# Patient Record
Sex: FEMALE | Race: WHITE | NOT HISPANIC OR LATINO | Employment: FULL TIME | URBAN - METROPOLITAN AREA
[De-identification: names, ages, dates, MRNs, and addresses within clinical notes are randomized per-mention and may not be internally consistent; named-entity substitution may affect disease eponyms.]

---

## 2017-03-26 ENCOUNTER — APPOINTMENT (OUTPATIENT)
Dept: GENERAL RADIOLOGY | Facility: HOSPITAL | Age: 27
End: 2017-03-26

## 2017-03-26 ENCOUNTER — HOSPITAL ENCOUNTER (EMERGENCY)
Facility: HOSPITAL | Age: 27
Discharge: HOME | End: 2017-03-26
Attending: EMERGENCY MEDICINE | Admitting: EMERGENCY MEDICINE

## 2017-03-26 VITALS
RESPIRATION RATE: 16 BRPM | WEIGHT: 150 LBS | HEART RATE: 66 BPM | HEIGHT: 65 IN | SYSTOLIC BLOOD PRESSURE: 110 MMHG | BODY MASS INDEX: 24.99 KG/M2 | OXYGEN SATURATION: 98 % | TEMPERATURE: 98.3 F | DIASTOLIC BLOOD PRESSURE: 70 MMHG

## 2017-03-26 DIAGNOSIS — T14.8XXA AVULSION OF SKIN: Primary | ICD-10-CM

## 2017-03-26 PROCEDURE — 73130 X-RAY EXAM OF HAND: CPT

## 2017-03-26 PROCEDURE — 99283 EMERGENCY DEPT VISIT LOW MDM: CPT

## 2017-03-26 RX ORDER — CEPHALEXIN 500 MG/1
500 CAPSULE ORAL 4 TIMES DAILY
Qty: 20 CAPSULE | Refills: 0 | Status: SHIPPED | OUTPATIENT
Start: 2017-03-26 | End: 2017-03-31

## 2017-03-26 NOTE — ED NOTES
Pt reports lac to left thumb after cutting it with a mirror. Pt to ER room with gauze applied per RANDY Taylor. Bleeding is controlled with pressure dressing. Reassurance given; call light in reach. Pts breathing even and unlabored. Pt appears in NAD at this time. Family at bedside.        Jennifer Mac RN  03/26/17 5440

## 2017-03-26 NOTE — ED PROVIDER NOTES
The patient presents complaining of a laceration to the L thumb. Pt states that she cut her thumb on broken glass from a mirror. Pt states that she is R hand dominant. Pt states that her tetanus is UTD.    Patient is nontoxic appearing   Back/extremities: Avulsion to L thumb that is actively bleeding      I supervised care provided by the midlevel provider.  We have discussed this patient's history, physical exam, and treatment plan.  I have reviewed the note and personally saw and examined the patient and agree with the plan of care.    Entered by Zachariah Almonte, acting as scribe for Dr. Abarca.       Zachariah Almonte  03/26/17 4286       Brandon Abarca MD  03/26/17 3435

## 2017-03-26 NOTE — ED PROVIDER NOTES
EMERGENCY DEPARTMENT ENCOUNTER    CHIEF COMPLAINT  Chief Complaint: skin avulsion  History given by:patient  History limited by:nothing  Room Number:   PMD: No Known Provider      HPI:  Pt is a 26 y.o. female who presents with skin avulsion to the right thumb secondary to an injury that occurred 4 hours ago. Pt states that she was taking a mirror off of a wall when it fell and cut her thumb. Denies numbness or tingling.  Denies any additional area of injury.  She did not wash the wound at the time.  Pt states that her Tetanus is up-to-date.    Duration: 4 hours   Timing:constant  Location:right thumb  Radiation:none  Quality: 1 cm  Intensity/Severity:mild  Progression:unchanged  Associated Symptoms:none  Aggravating Factors:none stated  Alleviating Factors:none stated   Previous Episodes:none stated   Treatment before arrival:none stated     MEDICAL RECORD REVIEW  Pt has no pertinent history.    PAST MEDICAL HISTORY  Active Ambulatory Problems     Diagnosis Date Noted   • No Active Ambulatory Problems     Resolved Ambulatory Problems     Diagnosis Date Noted   •  contractions 2016   • Incompetent cervix in pregnancy 2016   • Premature rupture of membranes 2016     No Additional Past Medical History       PAST SURGICAL HISTORY  Past Surgical History:   Procedure Laterality Date   •  SECTION N/A 2016    Procedure:  SECTION PRIMARY;  Surgeon: Cecilia Dimas MD;  Location: [facility] LABOR DELIVERY;  Service:        FAMILY HISTORY  Family History   Problem Relation Age of Onset   • Hypertension Father    • Hypertension Mother        SOCIAL HISTORY  Social History     Social History   • Marital status:      Spouse name: N/A   • Number of children: N/A   • Years of education: N/A     Occupational History   • Not on file.     Social History Main Topics   • Smoking status: Never Smoker   • Smokeless tobacco: Not on file   • Alcohol use No   • Drug use: No   • Sexual  activity: Yes     Partners: Male     Other Topics Concern   • Not on file     Social History Narrative       ALLERGIES  Review of patient's allergies indicates no known allergies.    REVIEW OF SYSTEMS  Review of Systems   Constitutional: Negative.  Negative for activity change, appetite change, chills, fatigue and fever.   HENT: Negative.  Negative for congestion, ear pain, rhinorrhea and sore throat.    Eyes: Negative.    Respiratory: Negative.  Negative for cough and shortness of breath.    Cardiovascular: Negative.  Negative for chest pain, palpitations and leg swelling.   Gastrointestinal: Negative.  Negative for abdominal pain, constipation, diarrhea, nausea and vomiting.   Endocrine: Negative.    Genitourinary: Negative.  Negative for decreased urine volume, difficulty urinating, dysuria, menstrual problem, pelvic pain, urgency and vaginal discharge.   Musculoskeletal: Negative.  Negative for back pain.   Skin: Positive for wound (laceration to the right thumb). Negative for rash.   Allergic/Immunologic: Negative.    Neurological: Negative.  Negative for dizziness, weakness, light-headedness, numbness and headaches.   Hematological: Negative.    Psychiatric/Behavioral: Negative.  The patient is not nervous/anxious.    All other systems reviewed and are negative.      PHYSICAL EXAM  ED Triage Vitals   Temp Heart Rate Resp BP SpO2   03/26/17 1336 03/26/17 1336 03/26/17 1336 03/26/17 1338 03/26/17 1336   99.1 °F (37.3 °C) 79 16 127/90 98 %      Temp src Heart Rate Source Patient Position BP Location FiO2 (%)   03/26/17 1336 03/26/17 1336 -- -- --   Tympanic Monitor          Physical Exam   Constitutional: She is oriented to person, place, and time and well-developed, well-nourished, and in no distress. No distress.   HENT:   Head: Normocephalic and atraumatic.   Eyes: EOM are normal.   Neck: Normal range of motion.   Pulmonary/Chest: Effort normal. No respiratory distress.   Musculoskeletal:   Pt moves joints of  the thumb without difficulty.   Neurological: She is alert and oriented to person, place, and time.   Skin: Skin is warm and dry. No pallor.   1 cm area to the lateral aspect of her thumb that is avulsed with active bleeding   Psychiatric: Mood, memory, affect and judgment normal.   Nursing note and vitals reviewed.    RADIOLOGY  XR Hand 3+ View Left   Preliminary Result   No osseous deformity is evident.              I ordered the above noted radiological studies and reviewed the images on the PACS system.          PROCEDURES  Laceration Repair  Date/Time: 3/26/2017 4:58 PM  Performed by: JEREMIAS BLUNT  Authorized by: JS REDDY   Consent: Verbal consent obtained.  Risks and benefits: risks, benefits and alternatives were discussed  Consent given by: patient  Body area: upper extremity  Location details: right thumb  Laceration length: 1 cm  Foreign bodies: no foreign bodies  Tendon involvement: none  Nerve involvement: none  Vascular damage: no  Sedation:  Patient sedated: no    Preparation: Patient was prepped and draped in the usual sterile fashion.  Irrigation solution: saline (Hibicleanse)  Amount of cleaning: standard  Dressing: surgicel.  Patient tolerance: Patient tolerated the procedure well with no immediate complications          COURSE & MEDICAL DECISION MAKING  Pertinent Imaging studies that were ordered and reviewed are noted above.  Results were reviewed/discussed with the patient and they were also made aware of online assess.  Pt also made aware that some labs, such as cultures, will not be resulted during ER visit and follow up with PMD is necessary.       PROGRESS AND CONSULTS    Progress Notes:    1620- Reviewed pt's history and workup with Dr. Reddy.  After a bedside evaluation; Dr. eRddy agrees with the plan of care.    1624- Ordered XR left hand.    1658- Laceration procedure performed. Discussed xray findings at bedside with patient and father.  Instructed pt to keep  "dressing on for 24 hours and then keep it covered after that. Instructed pt to return to the ED if she is bleeding through the dressings. The patient's history, physical exam, and lab findings were discussed with the physician, who also performed a face to face history and physical exam.  I discussed all results and noted any abnormalities with patient.  Discussed absoute need to recheck abnormalities with their family physician.  I answered any of the patient's questions.  Discussed plan for discharge, as there is no emergent indication for admission.  Pt is agreeable and understands need for follow up and repeat testing.  Pt is aware that discharge does not mean that nothing is wrong but it indicates no emergency is present and they must continue care with their family physician.  Pt is discharged with instructions to follow up with primary care doctor to have their blood pressure rechecked.       MEDICATIONS GIVEN IN ER  Medications - No data to display    /70  Pulse 66  Temp 98.3 °F (36.8 °C) (Tympanic)   Resp 16  Ht 65\" (165.1 cm)  Wt 150 lb (68 kg)  SpO2 98%  BMI 24.96 kg/m2      DIAGNOSIS  Final diagnoses:   Avulsion of skin       FOLLOW UP   Pito Ulloa MD  [address] [place] [address]  [phone]            RX     Medication List      New Prescriptions          cephalexin 500 MG capsule   Commonly known as:  KEFLEX   Take 1 capsule by mouth 4 (Four) Times a Day for 5 days.         Stop          EPINEPHrine 0.3 MG/0.3ML solution auto-injector injection   Commonly known as:  EPIPEN 2-KIRSTIE           I personally scribed for Mariann Taylor PA-C on 3/26/2017 at 5:42 PM.  Electronically signed by Elza Zamora on 3/26/2017 at time 5:42 PM           Mili Zamora  03/26/17 1712       Mariann Taylor PA-C  03/26/17 1718       Mariann Taylor PA-C  03/26/17 1742       Mariann Taylor PA-C  03/27/17 0002    "

## 2017-03-26 NOTE — DISCHARGE INSTRUCTIONS
PLEASE READ AND REVIEW ALL DISCHARGE INSTRUCTIONS.     Please follow up with your primary care physician for any further evaluation/treatment and further management of your blood pressure.     Take all prescribed medicine as written and continue chronic medication.    Keep dressing in place for 24 hours.  After that time, area clean, dry, and covered with new clean dressing.      If you have any redness, increased pain, warmth, or pustular drainage, return to the ER.  Follow-up with Dr. Ulloa, hand surgeon.

## 2017-06-20 ENCOUNTER — TRANSCRIBE ORDERS (OUTPATIENT)
Dept: ADMINISTRATIVE | Facility: HOSPITAL | Age: 27
End: 2017-06-20

## 2017-06-20 DIAGNOSIS — Z87.51 HISTORY OF PRETERM DELIVERY: Primary | ICD-10-CM

## 2017-06-30 ENCOUNTER — HOSPITAL ENCOUNTER (OUTPATIENT)
Dept: ULTRASOUND IMAGING | Facility: HOSPITAL | Age: 27
Discharge: HOME | End: 2017-06-30
Attending: OBSTETRICS & GYNECOLOGY

## 2017-12-22 ENCOUNTER — ANESTHESIA (OUTPATIENT)
Dept: PERIOP | Facility: HOSPITAL | Age: 27
End: 2017-12-22

## 2017-12-22 ENCOUNTER — ANESTHESIA EVENT (OUTPATIENT)
Dept: PERIOP | Facility: HOSPITAL | Age: 27
End: 2017-12-22

## 2017-12-22 ENCOUNTER — HOSPITAL ENCOUNTER (OUTPATIENT)
Facility: HOSPITAL | Age: 27
Setting detail: HOSPITAL OUTPATIENT SURGERY
Discharge: HOME | End: 2017-12-22
Attending: OBSTETRICS & GYNECOLOGY | Admitting: OBSTETRICS & GYNECOLOGY

## 2017-12-22 VITALS
SYSTOLIC BLOOD PRESSURE: 115 MMHG | WEIGHT: 141.3 LBS | HEIGHT: 65 IN | OXYGEN SATURATION: 98 % | RESPIRATION RATE: 16 BRPM | BODY MASS INDEX: 23.54 KG/M2 | HEART RATE: 58 BPM | TEMPERATURE: 98.6 F | DIASTOLIC BLOOD PRESSURE: 69 MMHG

## 2017-12-22 DIAGNOSIS — O02.1 MISSED AB: ICD-10-CM

## 2017-12-22 LAB
ABO GROUP BLD: NORMAL
BASOPHILS # BLD AUTO: 0.03 10*3/MM3 (ref 0–0.2)
BASOPHILS NFR BLD AUTO: 0.7 % (ref 0–1.5)
BLD GP AB SCN SERPL QL: NEGATIVE
DEPRECATED RDW RBC AUTO: 45.4 FL (ref 37–54)
EOSINOPHIL # BLD AUTO: 0.06 10*3/MM3 (ref 0–0.7)
EOSINOPHIL NFR BLD AUTO: 1.4 % (ref 0.3–6.2)
ERYTHROCYTE [DISTWIDTH] IN BLOOD BY AUTOMATED COUNT: 13.1 % (ref 11.7–13)
HCT VFR BLD AUTO: 26.4 % (ref 35.6–45.5)
HGB BLD-MCNC: 8.8 G/DL (ref 11.9–15.5)
IMM GRANULOCYTES # BLD: 0 10*3/MM3 (ref 0–0.03)
IMM GRANULOCYTES NFR BLD: 0 % (ref 0–0.5)
LYMPHOCYTES # BLD AUTO: 1.72 10*3/MM3 (ref 0.9–4.8)
LYMPHOCYTES NFR BLD AUTO: 40.5 % (ref 19.6–45.3)
MCH RBC QN AUTO: 31.5 PG (ref 26.9–32)
MCHC RBC AUTO-ENTMCNC: 33.3 G/DL (ref 32.4–36.3)
MCV RBC AUTO: 94.6 FL (ref 80.5–98.2)
MONOCYTES # BLD AUTO: 0.42 10*3/MM3 (ref 0.2–1.2)
MONOCYTES NFR BLD AUTO: 9.9 % (ref 5–12)
NEUTROPHILS # BLD AUTO: 2.02 10*3/MM3 (ref 1.9–8.1)
NEUTROPHILS NFR BLD AUTO: 47.5 % (ref 42.7–76)
PLATELET # BLD AUTO: 143 10*3/MM3 (ref 140–500)
PMV BLD AUTO: 10.1 FL (ref 6–12)
RBC # BLD AUTO: 2.79 10*6/MM3 (ref 3.9–5.2)
RH BLD: POSITIVE
WBC NRBC COR # BLD: 4.25 10*3/MM3 (ref 4.5–10.7)

## 2017-12-22 PROCEDURE — 25010000002 MIDAZOLAM PER 1 MG: Performed by: ANESTHESIOLOGY

## 2017-12-22 PROCEDURE — 25010000002 MIDAZOLAM PER 1 MG: Performed by: NURSE ANESTHETIST, CERTIFIED REGISTERED

## 2017-12-22 PROCEDURE — 25010000002 PROPOFOL 10 MG/ML EMULSION: Performed by: NURSE ANESTHETIST, CERTIFIED REGISTERED

## 2017-12-22 PROCEDURE — 25010000002 DEXAMETHASONE PER 1 MG: Performed by: NURSE ANESTHETIST, CERTIFIED REGISTERED

## 2017-12-22 PROCEDURE — 85025 COMPLETE CBC W/AUTO DIFF WBC: CPT | Performed by: OBSTETRICS & GYNECOLOGY

## 2017-12-22 PROCEDURE — 86901 BLOOD TYPING SEROLOGIC RH(D): CPT | Performed by: OBSTETRICS & GYNECOLOGY

## 2017-12-22 PROCEDURE — 86850 RBC ANTIBODY SCREEN: CPT | Performed by: OBSTETRICS & GYNECOLOGY

## 2017-12-22 PROCEDURE — 25010000002 ROPIVACAINE PER 1 MG: Performed by: OBSTETRICS & GYNECOLOGY

## 2017-12-22 PROCEDURE — 88305 TISSUE EXAM BY PATHOLOGIST: CPT | Performed by: OBSTETRICS & GYNECOLOGY

## 2017-12-22 PROCEDURE — 25010000002 FENTANYL CITRATE (PF) 100 MCG/2ML SOLUTION: Performed by: NURSE ANESTHETIST, CERTIFIED REGISTERED

## 2017-12-22 PROCEDURE — 86900 BLOOD TYPING SEROLOGIC ABO: CPT | Performed by: OBSTETRICS & GYNECOLOGY

## 2017-12-22 PROCEDURE — 25010000002 ONDANSETRON PER 1 MG: Performed by: NURSE ANESTHETIST, CERTIFIED REGISTERED

## 2017-12-22 RX ORDER — PROMETHAZINE HYDROCHLORIDE 25 MG/1
12.5 TABLET ORAL ONCE AS NEEDED
Status: DISCONTINUED | OUTPATIENT
Start: 2017-12-22 | End: 2017-12-22 | Stop reason: HOSPADM

## 2017-12-22 RX ORDER — DEXAMETHASONE SODIUM PHOSPHATE 4 MG/ML
INJECTION, SOLUTION INTRA-ARTICULAR; INTRALESIONAL; INTRAMUSCULAR; INTRAVENOUS; SOFT TISSUE AS NEEDED
Status: DISCONTINUED | OUTPATIENT
Start: 2017-12-22 | End: 2017-12-22 | Stop reason: SURG

## 2017-12-22 RX ORDER — MAGNESIUM HYDROXIDE 1200 MG/15ML
LIQUID ORAL AS NEEDED
Status: DISCONTINUED | OUTPATIENT
Start: 2017-12-22 | End: 2017-12-22 | Stop reason: HOSPADM

## 2017-12-22 RX ORDER — PROMETHAZINE HYDROCHLORIDE 25 MG/1
25 SUPPOSITORY RECTAL ONCE AS NEEDED
Status: DISCONTINUED | OUTPATIENT
Start: 2017-12-22 | End: 2017-12-22 | Stop reason: HOSPADM

## 2017-12-22 RX ORDER — FLUMAZENIL 0.1 MG/ML
0.2 INJECTION INTRAVENOUS AS NEEDED
Status: DISCONTINUED | OUTPATIENT
Start: 2017-12-22 | End: 2017-12-22 | Stop reason: HOSPADM

## 2017-12-22 RX ORDER — PROPOFOL 10 MG/ML
VIAL (ML) INTRAVENOUS CONTINUOUS PRN
Status: DISCONTINUED | OUTPATIENT
Start: 2017-12-22 | End: 2017-12-22 | Stop reason: SURG

## 2017-12-22 RX ORDER — HYDROCODONE BITARTRATE AND ACETAMINOPHEN 7.5; 325 MG/1; MG/1
1 TABLET ORAL ONCE AS NEEDED
Status: DISCONTINUED | OUTPATIENT
Start: 2017-12-22 | End: 2017-12-22 | Stop reason: HOSPADM

## 2017-12-22 RX ORDER — OXYCODONE AND ACETAMINOPHEN 7.5; 325 MG/1; MG/1
1 TABLET ORAL ONCE AS NEEDED
Status: DISCONTINUED | OUTPATIENT
Start: 2017-12-22 | End: 2017-12-22 | Stop reason: HOSPADM

## 2017-12-22 RX ORDER — FENTANYL CITRATE 50 UG/ML
50 INJECTION, SOLUTION INTRAMUSCULAR; INTRAVENOUS
Status: DISCONTINUED | OUTPATIENT
Start: 2017-12-22 | End: 2017-12-22 | Stop reason: HOSPADM

## 2017-12-22 RX ORDER — OXYCODONE HYDROCHLORIDE AND ACETAMINOPHEN 5; 325 MG/1; MG/1
1 TABLET ORAL EVERY 4 HOURS PRN
Qty: 30 TABLET | Refills: 0 | Status: SHIPPED | OUTPATIENT
Start: 2017-12-22 | End: 2019-03-13 | Stop reason: HOSPADM

## 2017-12-22 RX ORDER — PROMETHAZINE HYDROCHLORIDE 25 MG/ML
12.5 INJECTION, SOLUTION INTRAMUSCULAR; INTRAVENOUS ONCE AS NEEDED
Status: DISCONTINUED | OUTPATIENT
Start: 2017-12-22 | End: 2017-12-22 | Stop reason: HOSPADM

## 2017-12-22 RX ORDER — ONDANSETRON 2 MG/ML
INJECTION INTRAMUSCULAR; INTRAVENOUS AS NEEDED
Status: DISCONTINUED | OUTPATIENT
Start: 2017-12-22 | End: 2017-12-22 | Stop reason: SURG

## 2017-12-22 RX ORDER — ROPIVACAINE HYDROCHLORIDE 5 MG/ML
INJECTION, SOLUTION EPIDURAL; INFILTRATION; PERINEURAL AS NEEDED
Status: DISCONTINUED | OUTPATIENT
Start: 2017-12-22 | End: 2017-12-22 | Stop reason: HOSPADM

## 2017-12-22 RX ORDER — MIDAZOLAM HYDROCHLORIDE 1 MG/ML
2 INJECTION INTRAMUSCULAR; INTRAVENOUS
Status: DISCONTINUED | OUTPATIENT
Start: 2017-12-22 | End: 2017-12-22 | Stop reason: HOSPADM

## 2017-12-22 RX ORDER — IBUPROFEN 800 MG/1
800 TABLET ORAL EVERY 6 HOURS PRN
Qty: 50 TABLET | Refills: 3 | Status: SHIPPED | OUTPATIENT
Start: 2017-12-22 | End: 2019-03-13 | Stop reason: HOSPADM

## 2017-12-22 RX ORDER — SODIUM CHLORIDE, SODIUM LACTATE, POTASSIUM CHLORIDE, CALCIUM CHLORIDE 600; 310; 30; 20 MG/100ML; MG/100ML; MG/100ML; MG/100ML
9 INJECTION, SOLUTION INTRAVENOUS CONTINUOUS
Status: DISCONTINUED | OUTPATIENT
Start: 2017-12-22 | End: 2017-12-22 | Stop reason: HOSPADM

## 2017-12-22 RX ORDER — DIPHENHYDRAMINE HYDROCHLORIDE 50 MG/ML
12.5 INJECTION INTRAMUSCULAR; INTRAVENOUS
Status: DISCONTINUED | OUTPATIENT
Start: 2017-12-22 | End: 2017-12-22 | Stop reason: HOSPADM

## 2017-12-22 RX ORDER — MIDAZOLAM HYDROCHLORIDE 1 MG/ML
1 INJECTION INTRAMUSCULAR; INTRAVENOUS
Status: DISCONTINUED | OUTPATIENT
Start: 2017-12-22 | End: 2017-12-22 | Stop reason: HOSPADM

## 2017-12-22 RX ORDER — NALOXONE HCL 0.4 MG/ML
0.2 VIAL (ML) INJECTION AS NEEDED
Status: DISCONTINUED | OUTPATIENT
Start: 2017-12-22 | End: 2017-12-22 | Stop reason: HOSPADM

## 2017-12-22 RX ORDER — PROMETHAZINE HYDROCHLORIDE 25 MG/1
25 TABLET ORAL ONCE AS NEEDED
Status: DISCONTINUED | OUTPATIENT
Start: 2017-12-22 | End: 2017-12-22 | Stop reason: HOSPADM

## 2017-12-22 RX ORDER — ONDANSETRON 2 MG/ML
4 INJECTION INTRAMUSCULAR; INTRAVENOUS ONCE AS NEEDED
Status: DISCONTINUED | OUTPATIENT
Start: 2017-12-22 | End: 2017-12-22 | Stop reason: HOSPADM

## 2017-12-22 RX ORDER — MIDAZOLAM HYDROCHLORIDE 1 MG/ML
INJECTION INTRAMUSCULAR; INTRAVENOUS AS NEEDED
Status: DISCONTINUED | OUTPATIENT
Start: 2017-12-22 | End: 2017-12-22 | Stop reason: SURG

## 2017-12-22 RX ORDER — FAMOTIDINE 10 MG/ML
20 INJECTION, SOLUTION INTRAVENOUS ONCE
Status: COMPLETED | OUTPATIENT
Start: 2017-12-22 | End: 2017-12-22

## 2017-12-22 RX ORDER — FENTANYL CITRATE 50 UG/ML
INJECTION, SOLUTION INTRAMUSCULAR; INTRAVENOUS AS NEEDED
Status: DISCONTINUED | OUTPATIENT
Start: 2017-12-22 | End: 2017-12-22 | Stop reason: SURG

## 2017-12-22 RX ORDER — SODIUM CHLORIDE 0.9 % (FLUSH) 0.9 %
1-10 SYRINGE (ML) INJECTION AS NEEDED
Status: DISCONTINUED | OUTPATIENT
Start: 2017-12-22 | End: 2017-12-22 | Stop reason: HOSPADM

## 2017-12-22 RX ORDER — EPHEDRINE SULFATE 50 MG/ML
5 INJECTION, SOLUTION INTRAVENOUS ONCE AS NEEDED
Status: DISCONTINUED | OUTPATIENT
Start: 2017-12-22 | End: 2017-12-22 | Stop reason: HOSPADM

## 2017-12-22 RX ADMIN — Medication 1 MG: at 09:11

## 2017-12-22 RX ADMIN — SODIUM CHLORIDE, POTASSIUM CHLORIDE, SODIUM LACTATE AND CALCIUM CHLORIDE: 600; 310; 30; 20 INJECTION, SOLUTION INTRAVENOUS at 09:23

## 2017-12-22 RX ADMIN — FENTANYL CITRATE 50 MCG: 50 INJECTION INTRAMUSCULAR; INTRAVENOUS at 09:25

## 2017-12-22 RX ADMIN — ONDANSETRON 4 MG: 2 INJECTION INTRAMUSCULAR; INTRAVENOUS at 09:34

## 2017-12-22 RX ADMIN — FAMOTIDINE 20 MG: 10 INJECTION, SOLUTION INTRAVENOUS at 09:12

## 2017-12-22 RX ADMIN — PROPOFOL 200 MCG/KG/MIN: 10 INJECTION, EMULSION INTRAVENOUS at 09:25

## 2017-12-22 RX ADMIN — DEXAMETHASONE SODIUM PHOSPHATE 8 MG: 4 INJECTION INTRA-ARTICULAR; INTRALESIONAL; INTRAMUSCULAR; INTRAVENOUS; SOFT TISSUE at 09:31

## 2017-12-22 RX ADMIN — SODIUM CHLORIDE, POTASSIUM CHLORIDE, SODIUM LACTATE AND CALCIUM CHLORIDE 9 ML/HR: 600; 310; 30; 20 INJECTION, SOLUTION INTRAVENOUS at 09:11

## 2017-12-22 RX ADMIN — FENTANYL CITRATE 50 MCG: 50 INJECTION INTRAMUSCULAR; INTRAVENOUS at 09:28

## 2017-12-22 RX ADMIN — Medication 2 MG: at 09:23

## 2017-12-22 NOTE — ANESTHESIA PREPROCEDURE EVALUATION
Anesthesia Evaluation     Patient summary reviewed and Nursing notes reviewed   NPO Solid Status: > 8 hours  NPO Liquid Status: > 8 hours     Airway   Mallampati: II  no difficulty expected  Dental - normal exam     Pulmonary     breath sounds clear to auscultation  Cardiovascular     Rhythm: regular        Neuro/Psych  GI/Hepatic/Renal/Endo      Musculoskeletal     Abdominal    Substance History      OB/GYN          Other                                        Anesthesia Plan    ASA 1     MAC   (MAC)  intravenous induction   Anesthetic plan and risks discussed with patient.

## 2017-12-22 NOTE — ANESTHESIA POSTPROCEDURE EVALUATION
"Patient: Jer Belcher    Procedure Summary     Date Anesthesia Start Anesthesia Stop Room / Location    12/22/17 0923 0957  [facility] / [facility]       Procedure Diagnosis Surgeon Provider    DILATATION AND CURETTAGE WITH SUCTION (N/A Vagina) No diagnosis on file. MD Jose Bargg MD          Anesthesia Type: MAC  Last vitals  BP   115/69 (12/22/17 1040)   Temp   37 °C (98.6 °F) (12/22/17 0954)   Pulse   58 (12/22/17 1040)   Resp   16 (12/22/17 1040)     SpO2   98 % (12/22/17 1040)     Post Anesthesia Care and Evaluation    Patient location during evaluation: PACU  Patient participation: complete - patient participated  Level of consciousness: awake and alert  Pain management: adequate  Airway patency: patent  Anesthetic complications: No anesthetic complications    Cardiovascular status: acceptable  Respiratory status: acceptable  Hydration status: acceptable    Comments: /69  Pulse 58  Temp 37 °C (98.6 °F) (Oral)   Resp 16  Ht 165.1 cm (65\")  Wt 64.1 kg (141 lb 4.8 oz)  SpO2 98%  BMI 23.51 kg/m2      "

## 2017-12-22 NOTE — INTERVAL H&P NOTE
H&P reviewed. The patient was examined and there are no changes to the H&P.     Cecilia Dimas MD  12/22/17  8:44 AM

## 2017-12-22 NOTE — OP NOTE
Subjective     Date of Service:  12/22/17    Surgical Staff: Surgeon(s) and Role:     * Cecilia Dimas MD - Primary   Assistant:  none     Pre-operative diagnosis(es): Missed ab     Post-operative diagnosis(es): same       Procedure(s): Procedure(s):  DILATATION AND CURETTAGE WITH SUCTION           Anesthesia: Type: Monitor Anesthesia Care  ASA:  I     Objective      Operative findings: POCs   Specimens removed:   ID Type Source Tests Collected by Time Destination   A : GESTATIONAL AGE 6.4 WEEKS Tissue Products of Conception TISSUE EXAM Cecilia iDmas MD 12/22/2017 0854           EBL:    Specimens:   minimal      Order Name Source Comment Collection Info Order Time   TYPE AND SCREEN   Collected By: Shannan Dodge RN 12/22/2017  8:38 AM   TISSUE PATHOLOGY EXAM Products of Conception  Collected By: Cecilia Dimas MD 12/22/2017  9:47 AM      Antibiotics:                none ordered on call to OR   Drains:     [REMOVED] Urethral Catheter 12/22/17 0935 100% silicone (Removed)   Removed 12/22/17 0936          Complications: none       Indication for surgery: Missed ab       Description of Procedure: Patient was identified in holding and taken to the operating room.  She was placed in the supine position and sedated.  She was then placed the lithotomy position and prepped and draped.  Surgical timeout was done to assess correct patient and procedure.  Weighted speculum was placed in the vagina and anterior lip of cervix was grasped with an Allis clamp.  Paracervical block was given half percent Marcaine was used.  Uterine cavity sounded to 8 cm depth.  Dilatation of the cervix was easily accomplished and suction and sharp curettage were used in combination to remove all tissue.  This was felt to be complete instruments removed from the vagina patient tolerated procedure well and went to recovery in good condition.  Sponge and instrument counts are correct.           Assessment/Plan               Cecilia Dimas  MD  12/22/17  9:48 AM

## 2017-12-23 LAB
CYTO UR: NORMAL
LAB AP CASE REPORT: NORMAL
Lab: NORMAL
PATH REPORT.FINAL DX SPEC: NORMAL
PATH REPORT.GROSS SPEC: NORMAL

## 2018-10-11 LAB
EXTERNAL HEPATITIS B SURFACE ANTIGEN: NEGATIVE
EXTERNAL HEPATITIS C AB: NON REACTIVE
EXTERNAL RUBELLA QUALITATIVE: NORMAL
EXTERNAL SYPHILIS RPR SCREEN: NORMAL
HIV1 P24 AG SERPL QL IA: NORMAL

## 2019-01-11 ENCOUNTER — TRANSCRIBE ORDERS (OUTPATIENT)
Dept: ADMINISTRATIVE | Facility: HOSPITAL | Age: 29
End: 2019-01-11

## 2019-01-11 DIAGNOSIS — N88.3 INCOMPETENT CERVIX: ICD-10-CM

## 2019-01-11 DIAGNOSIS — Z87.51 HISTORY OF PRETERM DELIVERY: Primary | ICD-10-CM

## 2019-01-22 ENCOUNTER — HOSPITAL ENCOUNTER (OUTPATIENT)
Dept: ULTRASOUND IMAGING | Facility: HOSPITAL | Age: 29
Discharge: HOME | End: 2019-01-22
Attending: OBSTETRICS & GYNECOLOGY | Admitting: OBSTETRICS & GYNECOLOGY

## 2019-01-22 DIAGNOSIS — N88.3 INCOMPETENT CERVIX: ICD-10-CM

## 2019-01-22 DIAGNOSIS — Z87.51 HISTORY OF PRETERM DELIVERY: ICD-10-CM

## 2019-01-22 PROCEDURE — 76817 TRANSVAGINAL US OBSTETRIC: CPT

## 2019-01-22 PROCEDURE — 76805 OB US >/= 14 WKS SNGL FETUS: CPT

## 2019-01-22 NOTE — CONSULTS
"Ms. Belcher is referred by Dr Neal for MFM consultation on indication of \"incompetent cervix  She is accompanied by  Lexx who was present throughout the ultrasound exam and consultation.    30  23 1/7 at per JORY 19 per 8 3/7 week CRL    Prenatal course is significant for:  Hx PTD at 32 weeks, short cx at 23 weeks.      PMH  Obstetric History       T0      L1     SAB0   TAB0   Ectopic0   Molar0   Multiple0   Live Births1       # Outcome Date GA Lbr Jose/2nd Weight Sex Delivery Anes PTL Lv   1  16 31w6d  2044 g (4 lb 8.1 oz) M CS-LTranv Spinal Y KAIDEN      Apgar1:  8                Apgar5: 9          Past Medical History:   Diagnosis Date   • Miscarriage        No Known Allergies    Past Surgical History:   Procedure Laterality Date   •  SECTION N/A 2016    Procedure:  SECTION PRIMARY;  Surgeon: Cecilia Dimas MD;  Location: [facility] LABOR DELIVERY;  Service:    • D&C WITH SUCTION N/A 2017    Procedure: DILATATION AND CURETTAGE WITH SUCTION;  Surgeon: Cecilia Dimas MD;  Location: [facility] MAIN OR;  Service:        ASA 81 mg  Nifedipine 20 mg capsule 1 q 4 hours prn ctx    Family history is negative for mental retardation, trisomy 21, congenital heart disease, spina bifida, cystic fibrosis, Gnosticist ancestry.   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  PPROM and delivery at 32 weeks.   Normal cervical length at present.  Length of cervix is curved. Internal cervical os is flat, T shaped. A funnel of membranes and amniotic fluid is not present.     Nifedapine does not affect the incidence of recurrent  birth or improve  " outcome.       Recommendations:    Continue Prometrium 200mg capsules 1 intravaginally each night at bedtime thru 36 6/7 weeks.   Further TV US cervical length as clinically indicated by signs or symptoms of  birth.     Evaluate interval growth at 30, 36 weeks gestation     For billing purposes, duration of face to face consultation was approximately 15 minutes of which > 50% was devoted to patient counseling and coordination of care, exclusive of US exam.

## 2019-02-10 ENCOUNTER — HOSPITAL ENCOUNTER (OUTPATIENT)
Facility: HOSPITAL | Age: 29
Setting detail: OBSERVATION
Discharge: HOME | End: 2019-02-11
Attending: OBSTETRICS & GYNECOLOGY | Admitting: OBSTETRICS & GYNECOLOGY

## 2019-02-10 PROBLEM — Z34.90 PREGNANCY: Status: ACTIVE | Noted: 2019-02-10

## 2019-02-10 PROCEDURE — G0378 HOSPITAL OBSERVATION PER HR: HCPCS

## 2019-02-10 PROCEDURE — 59025 FETAL NON-STRESS TEST: CPT

## 2019-02-10 PROCEDURE — 96372 THER/PROPH/DIAG INJ SC/IM: CPT

## 2019-02-10 RX ORDER — NIFEDIPINE 20 MG/1
20 CAPSULE ORAL EVERY 4 HOURS PRN
COMMUNITY
End: 2019-05-11 | Stop reason: HOSPADM

## 2019-02-10 RX ORDER — TERBUTALINE SULFATE 1 MG/ML
0.25 INJECTION, SOLUTION SUBCUTANEOUS ONCE AS NEEDED
Status: DISCONTINUED | OUTPATIENT
Start: 2019-02-11 | End: 2019-02-11 | Stop reason: HOSPADM

## 2019-02-10 RX ORDER — ASPIRIN 81 MG/1
81 TABLET, CHEWABLE ORAL DAILY
COMMUNITY
End: 2019-05-11 | Stop reason: HOSPADM

## 2019-02-10 RX ORDER — TERBUTALINE SULFATE 1 MG/ML
0.25 INJECTION, SOLUTION SUBCUTANEOUS ONCE
Status: COMPLETED | OUTPATIENT
Start: 2019-02-11 | End: 2019-02-10

## 2019-02-10 RX ADMIN — TERBUTALINE SULFATE 0.25 MG: 1 INJECTION SUBCUTANEOUS at 23:27

## 2019-02-11 VITALS
SYSTOLIC BLOOD PRESSURE: 113 MMHG | WEIGHT: 155 LBS | HEIGHT: 65 IN | TEMPERATURE: 98 F | RESPIRATION RATE: 16 BRPM | BODY MASS INDEX: 25.83 KG/M2 | DIASTOLIC BLOOD PRESSURE: 69 MMHG | HEART RATE: 79 BPM

## 2019-02-11 PROCEDURE — G0378 HOSPITAL OBSERVATION PER HR: HCPCS

## 2019-02-11 PROCEDURE — 25010000002 TERBUTALINE PER 1 MG: Performed by: OBSTETRICS & GYNECOLOGY

## 2019-02-11 RX ORDER — NIFEDIPINE 10 MG/1
20 CAPSULE ORAL ONCE
Status: COMPLETED | OUTPATIENT
Start: 2019-02-11 | End: 2019-02-11

## 2019-02-11 RX ADMIN — NIFEDIPINE 20 MG: 10 CAPSULE ORAL at 01:39

## 2019-03-11 ENCOUNTER — HOSPITAL ENCOUNTER (INPATIENT)
Facility: HOSPITAL | Age: 29
LOS: 2 days | Discharge: HOME | End: 2019-03-13
Attending: OBSTETRICS & GYNECOLOGY | Admitting: OBSTETRICS & GYNECOLOGY

## 2019-03-11 LAB
ABO GROUP BLD: NORMAL
BLD GP AB SCN SERPL QL: NEGATIVE
DEPRECATED RDW RBC AUTO: 46.9 FL (ref 37–54)
ERYTHROCYTE [DISTWIDTH] IN BLOOD BY AUTOMATED COUNT: 13.2 % (ref 12.3–15.4)
HCT VFR BLD AUTO: 36.9 % (ref 34–46.6)
HGB BLD-MCNC: 12.1 G/DL (ref 12–15.9)
MCH RBC QN AUTO: 32.3 PG (ref 26.6–33)
MCHC RBC AUTO-ENTMCNC: 32.8 G/DL (ref 31.5–35.7)
MCV RBC AUTO: 98.4 FL (ref 79–97)
PLATELET # BLD AUTO: 216 10*3/MM3 (ref 140–450)
PMV BLD AUTO: 11.8 FL (ref 6–12)
RBC # BLD AUTO: 3.75 10*6/MM3 (ref 3.77–5.28)
RH BLD: POSITIVE
T&S EXPIRATION DATE: NORMAL
WBC NRBC COR # BLD: 11.05 10*3/MM3 (ref 3.4–10.8)

## 2019-03-11 PROCEDURE — G0378 HOSPITAL OBSERVATION PER HR: HCPCS

## 2019-03-11 PROCEDURE — 87081 CULTURE SCREEN ONLY: CPT | Performed by: OBSTETRICS & GYNECOLOGY

## 2019-03-11 PROCEDURE — 86900 BLOOD TYPING SEROLOGIC ABO: CPT | Performed by: OBSTETRICS & GYNECOLOGY

## 2019-03-11 PROCEDURE — 25010000002 BETAMETHASONE ACET & SOD PHOS PER 4 MG: Performed by: OBSTETRICS & GYNECOLOGY

## 2019-03-11 PROCEDURE — 85027 COMPLETE CBC AUTOMATED: CPT | Performed by: OBSTETRICS & GYNECOLOGY

## 2019-03-11 PROCEDURE — 86850 RBC ANTIBODY SCREEN: CPT | Performed by: OBSTETRICS & GYNECOLOGY

## 2019-03-11 PROCEDURE — 86901 BLOOD TYPING SEROLOGIC RH(D): CPT | Performed by: OBSTETRICS & GYNECOLOGY

## 2019-03-11 PROCEDURE — 25010000002 TERBUTALINE PER 1 MG: Performed by: OBSTETRICS & GYNECOLOGY

## 2019-03-11 PROCEDURE — 25010000002 AMPICILLIN PER 500 MG: Performed by: OBSTETRICS & GYNECOLOGY

## 2019-03-11 PROCEDURE — 59025 FETAL NON-STRESS TEST: CPT

## 2019-03-11 RX ORDER — SODIUM CHLORIDE 0.9 % (FLUSH) 0.9 %
3 SYRINGE (ML) INJECTION EVERY 12 HOURS SCHEDULED
Status: DISCONTINUED | OUTPATIENT
Start: 2019-03-11 | End: 2019-03-13 | Stop reason: HOSPADM

## 2019-03-11 RX ORDER — SODIUM CHLORIDE 0.9 % (FLUSH) 0.9 %
5 SYRINGE (ML) INJECTION AS NEEDED
Status: DISCONTINUED | OUTPATIENT
Start: 2019-03-11 | End: 2019-03-13 | Stop reason: HOSPADM

## 2019-03-11 RX ORDER — ACETAMINOPHEN 500 MG
1000 TABLET ORAL EVERY 6 HOURS PRN
COMMUNITY
End: 2019-05-11 | Stop reason: HOSPADM

## 2019-03-11 RX ORDER — SODIUM CHLORIDE, SODIUM LACTATE, POTASSIUM CHLORIDE, CALCIUM CHLORIDE 600; 310; 30; 20 MG/100ML; MG/100ML; MG/100ML; MG/100ML
75 INJECTION, SOLUTION INTRAVENOUS CONTINUOUS
Status: DISCONTINUED | OUTPATIENT
Start: 2019-03-11 | End: 2019-03-12

## 2019-03-11 RX ORDER — BISACODYL 5 MG/1
5 TABLET, DELAYED RELEASE ORAL DAILY PRN
Status: DISCONTINUED | OUTPATIENT
Start: 2019-03-11 | End: 2019-03-13 | Stop reason: HOSPADM

## 2019-03-11 RX ORDER — NIFEDIPINE 10 MG/1
20 CAPSULE ORAL ONCE
Status: COMPLETED | OUTPATIENT
Start: 2019-03-11 | End: 2019-03-11

## 2019-03-11 RX ORDER — PROMETHAZINE HYDROCHLORIDE 25 MG/1
12.5 TABLET ORAL EVERY 6 HOURS PRN
Status: DISCONTINUED | OUTPATIENT
Start: 2019-03-11 | End: 2019-03-13 | Stop reason: HOSPADM

## 2019-03-11 RX ORDER — BETAMETHASONE SODIUM PHOSPHATE AND BETAMETHASONE ACETATE 3; 3 MG/ML; MG/ML
12 INJECTION, SUSPENSION INTRA-ARTICULAR; INTRALESIONAL; INTRAMUSCULAR; SOFT TISSUE ONCE
Status: COMPLETED | OUTPATIENT
Start: 2019-03-12 | End: 2019-03-12

## 2019-03-11 RX ORDER — ONDANSETRON 2 MG/ML
4 INJECTION INTRAMUSCULAR; INTRAVENOUS EVERY 8 HOURS PRN
Status: DISCONTINUED | OUTPATIENT
Start: 2019-03-11 | End: 2019-03-13 | Stop reason: HOSPADM

## 2019-03-11 RX ORDER — BETAMETHASONE SODIUM PHOSPHATE AND BETAMETHASONE ACETATE 3; 3 MG/ML; MG/ML
12 INJECTION, SUSPENSION INTRA-ARTICULAR; INTRALESIONAL; INTRAMUSCULAR; SOFT TISSUE EVERY 24 HOURS
Status: DISCONTINUED | OUTPATIENT
Start: 2019-03-11 | End: 2019-03-11

## 2019-03-11 RX ORDER — ACETAMINOPHEN 325 MG/1
650 TABLET ORAL EVERY 4 HOURS PRN
Status: DISCONTINUED | OUTPATIENT
Start: 2019-03-11 | End: 2019-03-13 | Stop reason: HOSPADM

## 2019-03-11 RX ORDER — MAGNESIUM SULFATE HEPTAHYDRATE 40 MG/ML
2 INJECTION, SOLUTION INTRAVENOUS CONTINUOUS
Status: DISCONTINUED | OUTPATIENT
Start: 2019-03-11 | End: 2019-03-12

## 2019-03-11 RX ORDER — TERBUTALINE SULFATE 1 MG/ML
0.25 INJECTION, SOLUTION SUBCUTANEOUS ONCE
Status: COMPLETED | OUTPATIENT
Start: 2019-03-11 | End: 2019-03-11

## 2019-03-11 RX ADMIN — SODIUM CHLORIDE, POTASSIUM CHLORIDE, SODIUM LACTATE AND CALCIUM CHLORIDE 1000 ML: 600; 310; 30; 20 INJECTION, SOLUTION INTRAVENOUS at 13:25

## 2019-03-11 RX ADMIN — AMPICILLIN SODIUM 2 G: 2 INJECTION, POWDER, FOR SOLUTION INTRAMUSCULAR; INTRAVENOUS at 20:15

## 2019-03-11 RX ADMIN — BETAMETHASONE ACETATE AND BETAMETHASONE SODIUM PHOSPHATE 12 MG: 3; 3 INJECTION, SUSPENSION INTRA-ARTICULAR; INTRALESIONAL; INTRAMUSCULAR; SOFT TISSUE at 15:53

## 2019-03-11 RX ADMIN — NIFEDIPINE 20 MG: 10 CAPSULE ORAL at 13:29

## 2019-03-11 RX ADMIN — TERBUTALINE SULFATE 0.25 MG: 1 INJECTION SUBCUTANEOUS at 13:30

## 2019-03-11 RX ADMIN — SODIUM CHLORIDE, POTASSIUM CHLORIDE, SODIUM LACTATE AND CALCIUM CHLORIDE 125 ML/HR: 600; 310; 30; 20 INJECTION, SOLUTION INTRAVENOUS at 14:33

## 2019-03-11 RX ADMIN — MAGNESIUM SULFATE HEPTAHYDRATE 2 G/HR: 40 INJECTION, SOLUTION INTRAVENOUS at 23:40

## 2019-03-11 NOTE — PROGRESS NOTES
[facility]  Obstetric History and Physical    Patient Name: Jer Belcher  :  1990  MRN:  [id number]        Chief Complaint   Patient presents with   • Contractions     hx 31/6 delivery with last child. Was seen at office today for unrelated visit, but ctx noted, pt sates they are not painful but she can feel the tightening. Pt takes procardia 20 mg, last dose at 1015 today.        Subjective     Patient is a 28 y.o. female  currently at 30w0d, who presented to office with c/o intermittent sharp RLQ pain. Was noted to have contractions on monitor but had just taken her usual dose of procardia.     Initially just mild and irregular contractions noted. After exam started to have very frequent and significantly more painful contractions.    Now resolved after additional dose po procardia and 1 dose brethine.           Her prenatal care is significant for Hx   delivery with short cervix early in pregnancy @ 23 wks. Cx length normal during this pregnancy and also using progesterone PV.        Past Medical History: Past Medical History:   Diagnosis Date   • Miscarriage       Past Surgical History Past Surgical History:   Procedure Laterality Date   •  SECTION N/A 2016    Procedure:  SECTION PRIMARY;  Surgeon: Cecilia Dimas MD;  Location: [facility] LABOR DELIVERY;  Service:    • D&C WITH SUCTION N/A 2017    Procedure: DILATATION AND CURETTAGE WITH SUCTION;  Surgeon: Cecilia Dimas MD;  Location: [facility] OR;  Service:       Family History: Family History   Problem Relation Age of Onset   • Hypertension Father    • Hypertension Mother    • Malig Hyperthermia Neg Hx       Social History:  reports that  has never smoked. she has never used smokeless tobacco.   reports that she does not drink alcohol.   reports that she does not use drugs.    Allergies:     Patient has no known allergies.     Past OB History:       Obstetric History       T0      L1     SAB1    TAB0   Ectopic0   Molar0   Multiple0   Live Births1       # Outcome Date GA Lbr Jose/2nd Weight Sex Delivery Anes PTL Lv   3 Current            2 SAB 12/10/17           1  16 31w6d  2044 g (4 lb 8.1 oz) M CS-LTranv Spinal Y KAIDEN      Apgar1:  8                Apgar5: 9            Objective       Vital Signs Range for the last 24 hours  Temperature: Temp:  [97.9 °F (36.6 °C)] 97.9 °F (36.6 °C)   Temp Source: Temp src: Oral   BP: BP: (108)/(64) 108/64   Pulse: Heart Rate:  [82-94] 94   Respirations: Resp:  [18] 18             Physical Exam:        General :    Alert and cooperative in NAD   Abdomen:  Gravid, non-tender, no masses   Vaginal exam: Cl-ft/50/posterior     FHR:   140's moderate variability, no decelerations   [place]:   No contractions noted.               A/P:  30 wks EGA with  contractions. Had a run of painful contractions after her last exam but quickly resolved after po procardia, 1 dose terbutaline, and IV fluids. Continue to observe.                   Antonio Florez MD  3/11/2019  3:02 PM

## 2019-03-11 NOTE — PROGRESS NOTES
Approximately 2 hours after repeat dose of procardia and terbutaline - reported contractions again - every 2-3 mins with increased intensity. Given her past hx - with G1 changed from cl to 2 cm @ this gestational age then had ROM approximately 1 week later and active labor.     Recommended beginning Magnesium for neuroprotection and tocolysis. Start Betamethasone.       Antonio Florez MD  3/11/2019  6:27 PM

## 2019-03-12 PROCEDURE — 25010000002 ONDANSETRON PER 1 MG: Performed by: OBSTETRICS & GYNECOLOGY

## 2019-03-12 PROCEDURE — 25010000002 BETAMETHASONE ACET & SOD PHOS PER 4 MG: Performed by: OBSTETRICS & GYNECOLOGY

## 2019-03-12 PROCEDURE — 25010000002 AMPICILLIN PER 500 MG: Performed by: OBSTETRICS & GYNECOLOGY

## 2019-03-12 PROCEDURE — 59025 FETAL NON-STRESS TEST: CPT

## 2019-03-12 RX ORDER — BUTORPHANOL TARTRATE 1 MG/ML
1 INJECTION, SOLUTION INTRAMUSCULAR; INTRAVENOUS EVERY 4 HOURS PRN
Status: DISCONTINUED | OUTPATIENT
Start: 2019-03-12 | End: 2019-03-13 | Stop reason: HOSPADM

## 2019-03-12 RX ADMIN — SODIUM CHLORIDE, POTASSIUM CHLORIDE, SODIUM LACTATE AND CALCIUM CHLORIDE 75 ML/HR: 600; 310; 30; 20 INJECTION, SOLUTION INTRAVENOUS at 04:20

## 2019-03-12 RX ADMIN — BETAMETHASONE ACETATE AND BETAMETHASONE SODIUM PHOSPHATE 12 MG: 3; 3 INJECTION, SUSPENSION INTRA-ARTICULAR; INTRALESIONAL; INTRAMUSCULAR; SOFT TISSUE at 03:59

## 2019-03-12 RX ADMIN — AMPICILLIN SODIUM 2 G: 2 INJECTION, POWDER, FOR SOLUTION INTRAMUSCULAR; INTRAVENOUS at 08:33

## 2019-03-12 RX ADMIN — ONDANSETRON 4 MG: 2 INJECTION INTRAMUSCULAR; INTRAVENOUS at 05:01

## 2019-03-12 RX ADMIN — SODIUM CHLORIDE, PRESERVATIVE FREE 3 ML: 5 INJECTION INTRAVENOUS at 21:25

## 2019-03-12 RX ADMIN — MAGNESIUM SULFATE HEPTAHYDRATE 2 G/HR: 40 INJECTION, SOLUTION INTRAVENOUS at 08:32

## 2019-03-12 RX ADMIN — AMPICILLIN SODIUM 2 G: 2 INJECTION, POWDER, FOR SOLUTION INTRAMUSCULAR; INTRAVENOUS at 15:01

## 2019-03-12 RX ADMIN — AMPICILLIN SODIUM 2 G: 2 INJECTION, POWDER, FOR SOLUTION INTRAMUSCULAR; INTRAVENOUS at 03:08

## 2019-03-12 NOTE — PLAN OF CARE
Problem: Patient Care Overview  Goal: Plan of Care Review  Outcome: Ongoing (interventions implemented as appropriate)    Goal: Individualization and Mutuality  Outcome: Ongoing (interventions implemented as appropriate)   19 1376   Mutuality/Individual Preferences   How to Address Anxieties/Fears discuss plan   Mutuality/Individual Preferences   What Anxieties, Fears, Concerns, or Questions Do You Have About Your Care? fear of early delivery     Goal: Discharge Needs Assessment  Outcome: Ongoing (interventions implemented as appropriate)      Problem:  Labor (Adult,Obstetrics,Pediatric)  Goal: Signs and Symptoms of Listed Potential Problems Will be Absent, Minimized or Managed ( Labor)  Outcome: Ongoing (interventions implemented as appropriate)

## 2019-03-12 NOTE — PLAN OF CARE
Problem:  Labor (Adult,Obstetrics,Pediatric)  Goal: Signs and Symptoms of Listed Potential Problems Will be Absent, Minimized or Managed ( Labor)  Outcome: Ongoing (interventions implemented as appropriate)   19 3021   Goal/Outcome Evaluation   Problems Assessed ( Labor) all   Problems Present ( Labor) none

## 2019-03-12 NOTE — PLAN OF CARE
Problem: Patient Care Overview  Goal: Plan of Care Review  Outcome: Ongoing (interventions implemented as appropriate)   03/12/19 1534   Coping/Psychosocial   Plan of Care Reviewed With patient;spouse   OTHER   Outcome Summary Transferred to     Plan of Care Review   Progress improving

## 2019-03-12 NOTE — PROGRESS NOTES
"[facility]  Obstetric Progress Note    Subjective  Patient has no complaints.  She continues to have occasional contractions which she states are further apart but stronger in intensity than yesterday.  Patient has slight brownish tinged discharge.  No evidence of rupture membranes.  Patient states baby is active.      Objective     Vital Signs Range for the last 24 hours  Temp:  [97.7 °F (36.5 °C)-98.6 °F (37 °C)] 98.1 °F (36.7 °C)   Temp src: Oral   BP: (101-138)/(46-80) 112/61   Heart Rate:  [] 87     /61   Pulse 87   Temp 98.1 °F (36.7 °C) (Oral)   Resp 16   Ht 165.1 cm (65\")   Wt 74.4 kg (164 lb)   SpO2 100%   BMI 27.29 kg/m²               Fetal Heart Rate Assessment   Reassuring.    Assessment/Plan       Assessment:  Intrauterine pregnancy at 30 weeks with  labor.  Patient has a history of  labor and delivery at 31 weeks.  Patient has been stable on IV magnesium overnight.  She is received both doses of betamethasone.  She continues to have occasional contractions which she rates as a 5 in intensity.      Plan:  Continue IV magnesium for full 24 hours following betamethasone.  Will discontinue magnesium at that time.  And observe closely for labor.        Pregnancy        Vilma Kaur MD  3/12/2019  10:28 AM    "

## 2019-03-12 NOTE — PLAN OF CARE
Problem: Patient Care Overview  Goal: Individualization and Mutuality  Outcome: Ongoing (interventions implemented as appropriate)   19 1616 19 0536   Individualization   Patient Specific Preferences --  prefers  section for this delivery but might  if favorable and best for baby. will discuss with MD when the time comes. pt. will have more time off work with c/s   Patient Specific Goals (Include Timeframe) --  stay pregnant as long as possible and hopefully reach term gestation   Mutuality/Individual Preferences   What Anxieties, Fears, Concerns, or Questions Do You Have About Your Care? fear of early delivery --

## 2019-03-12 NOTE — CONSULTS
Prenatal Consult    Referring OB:  Gautam    Reason for Consultation: potential premature single at 30 weeks gestation    Maternal History:   First last name is a 28 y.o. yr/o  with:  labor.  The  EDC is  Estimated Date of Delivery: Estimated Date of Delivery: 19. Mom has received steroids and is getting mag now.     Consult:  mother available for discussion.  We reviewed the fetal and  aspects of the above conditions to include: estimated survival rate, beneficial effects of steroids, early onset of sepsis, intraventricular hemorrhage, cerebral palsy, mental retardation, apnea of prematurity, feeding difficulty and temperature instability    Estimated length of stay: 6-8 weeks  Discussed the importance of providing human milk for pre-term and late pre-term infants: yes  Reviewed policies and procedures for NICU/ICN  Reviewed structure and function of \[facility] [place] [facility]\""-Neonatology     We will plan to attend delivery when requested.    Plan for  resuscitation: full resuscitation    Additional comments: Mom with previous 32 wga infant 3 years ago here at [facility].     Thank you for allowing us to participate in the care of your patient.     Jasvir Hook MD  19  11:31 AM      30 minutes were spent in consultation, greater than 90% face to face time.

## 2019-03-12 NOTE — PLAN OF CARE
Problem: Patient Care Overview  Goal: Plan of Care Review  Outcome: Ongoing (interventions implemented as appropriate)   19 0536   Coping/Psychosocial   Plan of Care Reviewed With patient;spouse   OTHER   Outcome Summary reactive fht's, VSS, denies LOF, scant amount of brownish discharge/show, pt. on 2gm magneisum sulfate, continue to monitor contractions   Plan of Care Review   Progress improving     Goal: Individualization and Mutuality  Outcome: Ongoing (interventions implemented as appropriate)   19 1616 19 0536   Individualization   Patient Specific Preferences --  prefers  section for this delivery but might  if favorable and best for baby. will discuss with MD when the time comes. pt. will have more time off work with c/s   Patient Specific Goals (Include Timeframe) --  stay pregnant as long as possible and hopefully reach term gestation   Patient Specific Interventions --  i.v. fluids, terbutaline, procardia, magnesium sulfate, ampicillin, gbs culture sent,    Mutuality/Individual Preferences   How to Address Anxieties/Fears discuss plan --    Mutuality/Individual Preferences   What Anxieties, Fears, Concerns, or Questions Do You Have About Your Care? fear of early delivery --      Goal: Interprofessional Rounds/Family Conf  Outcome: Ongoing (interventions implemented as appropriate)   19 0536   Interdisciplinary Rounds/Family Conf   Participants patient;physician;family       Problem:  Labor (Adult,Obstetrics,Pediatric)  Goal: Signs and Symptoms of Listed Potential Problems Will be Absent, Minimized or Managed ( Labor)  Outcome: Ongoing (interventions implemented as appropriate)   19 1850   Goal/Outcome Evaluation   Problems Assessed ( Labor) all   Problems Present ( Labor) none

## 2019-03-12 NOTE — PLAN OF CARE
Problem: Patient Care Overview  Goal: Interprofessional Rounds/Family Conf  Outcome: Ongoing (interventions implemented as appropriate)   03/12/19 2912   Interdisciplinary Rounds/Family Conf   Participants family;nursing;patient;pharmacy;physician

## 2019-03-13 VITALS
OXYGEN SATURATION: 97 % | WEIGHT: 164 LBS | HEART RATE: 74 BPM | DIASTOLIC BLOOD PRESSURE: 65 MMHG | RESPIRATION RATE: 16 BRPM | BODY MASS INDEX: 27.32 KG/M2 | TEMPERATURE: 98.2 F | SYSTOLIC BLOOD PRESSURE: 107 MMHG | HEIGHT: 65 IN

## 2019-03-13 PROBLEM — O60.03 PRETERM LABOR IN THIRD TRIMESTER WITHOUT DELIVERY: Status: ACTIVE | Noted: 2019-03-13

## 2019-03-13 LAB — BACTERIA SPEC AEROBE CULT: NORMAL

## 2019-03-13 PROCEDURE — 59025 FETAL NON-STRESS TEST: CPT

## 2019-03-13 RX ORDER — NIFEDIPINE 10 MG/1
20 CAPSULE ORAL
Status: DISCONTINUED | OUTPATIENT
Start: 2019-03-13 | End: 2019-03-13 | Stop reason: HOSPADM

## 2019-03-13 RX ADMIN — NIFEDIPINE 20 MG: 10 CAPSULE ORAL at 12:30

## 2019-03-13 RX ADMIN — NIFEDIPINE 20 MG: 10 CAPSULE ORAL at 09:17

## 2019-03-13 RX ADMIN — SODIUM CHLORIDE, PRESERVATIVE FREE 3 ML: 5 INJECTION INTRAVENOUS at 10:00

## 2019-03-13 NOTE — NON STRESS TEST
Jer Belcher, a  at 30w2d with an JORY of 2019, by Patient Reported, was seen at [facility]TE[facility] for a nonstress test.    Chief Complaint   Patient presents with   • Contractions     hx  delivery with last child. Was seen at office today for unrelated visit, but ctx noted, pt sates they are not painful but she can feel the tightening. Pt takes procardia 20 mg, last dose at 1015 today.        Patient Active Problem List   Diagnosis   • Pregnancy       Start Time: 741  Stop Time: 1003  Interpretation A  Nonstress Test Interpretation A: Reactive (19 1003 : Monica Cutler, RN)

## 2019-03-13 NOTE — DISCHARGE SUMMARY
HD 2    incompetnent cvx with hx PTD at 31wks.    On vag progesterone and procardia. Admitted 2 days ago with inc contns. S/p BMZ x2. S/p mag sulfate. And stable off mag.    Restarted procardia this am as feels like procardia helps a lot with contns.   Mucus d/c. No vb. irreg contns. Spaced after procardia.  Good fm    cvx essentially unchanged from my exams in office - ft/70.-2. Bulging lower segment. Somewhat post.    nst- reactive,    toco- q 5-10 min and after procardia monitored for couple hrs and spaced to just rare contn.        A/p- incompentent CVX with PTL and hx PTD at 31wks    cvx unchanged. contns spaced. Cont procardia. D/c home. Modified bed rest and strict vag rest. She is reliable and lives 4 minutes from hospital. Has appt in 2 days with me in office.

## 2019-03-13 NOTE — PLAN OF CARE
Problem: Patient Care Overview  Goal: Plan of Care Review  Outcome: Ongoing (interventions implemented as appropriate)   19 1832 19 1928 19 0636   Coping/Psychosocial   Plan of Care Reviewed With --  patient --    OTHER   Outcome Summary --  --  RNST, VSS, +FM, denies LOF or vaginal or rectal pressure, pt. discharge today has been minimal and more brown in color rather than the pink she had yesterday. pt. given a heating pad for her back pain. pt. stated her pain is muscular from being in bed yesterday on magnesium. pt. was very tired and slept well last night.    Plan of Care Review   Progress improving --  --      Goal: Individualization and Mutuality  Outcome: Ongoing (interventions implemented as appropriate)   19 1616 19 0536   Individualization   Patient Specific Preferences --  prefers  section for this delivery but might  if favorable and best for baby. will discuss with MD when the time comes. pt. will have more time off work with c/s   Patient Specific Goals (Include Timeframe) --  stay pregnant as long as possible and hopefully reach term gestation   Patient Specific Interventions --  i.v. fluids, terbutaline, procardia, magnesium sulfate, ampicillin, gbs culture sent,    Mutuality/Individual Preferences   How to Address Anxieties/Fears discuss plan --    Mutuality/Individual Preferences   What Anxieties, Fears, Concerns, or Questions Do You Have About Your Care? fear of early delivery --      Goal: Interprofessional Rounds/Family Conf  Outcome: Ongoing (interventions implemented as appropriate)   19 183   Interdisciplinary Rounds/Family Conf   Participants family;nursing;patient;pharmacy;physician       Problem:  Labor (Adult,Obstetrics,Pediatric)  Goal: Signs and Symptoms of Listed Potential Problems Will be Absent, Minimized or Managed ( Labor)  Outcome: Ongoing (interventions implemented as appropriate)   19 1831   Goal/Outcome  Evaluation   Problems Assessed ( Labor) all   Problems Present ( Labor) none

## 2019-03-13 NOTE — NON STRESS TEST
Jer Belcher, a  at 30w1d with an JORY of 2019, by Patient Reported, was seen at [facility]TE[facility] UN[facility] for a nonstress test.    Chief Complaint   Patient presents with   • Contractions     hx  delivery with last child. Was seen at office today for unrelated visit, but ctx noted, pt sates they are not painful but she can feel the tightening. Pt takes procardia 20 mg, last dose at 1015 today.        Patient Active Problem List   Diagnosis   • Pregnancy       Start Time:   Stop Time:       reactive, 10x10 accels, moderate variability no decelerations

## 2019-03-13 NOTE — PLAN OF CARE
Problem: Patient Care Overview  Goal: Plan of Care Review  Outcome: Ongoing (interventions implemented as appropriate)   19 1242   Coping/Psychosocial   Plan of Care Reviewed With patient   OTHER   Outcome Summary RNST, VSS, no LOF, no VB, +UCs on [place] that began to space out once Procardia given; +FM. Vaginal d/c has been brownish mucus; doing very well overall; d/c to home on Procardia and with instructions to take it easy & pelvic rest.   Plan of Care Review   Progress improving     Goal: Individualization and Mutuality  Outcome: Ongoing (interventions implemented as appropriate)    Goal: Interprofessional Rounds/Family Conf  Outcome: Ongoing (interventions implemented as appropriate)      Problem:  Labor (Adult,Obstetrics,Pediatric)  Goal: Signs and Symptoms of Listed Potential Problems Will be Absent, Minimized or Managed ( Labor)  Outcome: Ongoing (interventions implemented as appropriate)      Problem: Prenatal Patient, Hospitalized (Adult,Obstetrics,Pediatric)  Goal: Signs and Symptoms of Listed Potential Problems Will be Absent, Minimized or Managed (Prenatal Patient, Hospitalized)  Outcome: Ongoing (interventions implemented as appropriate)

## 2019-03-13 NOTE — PROGRESS NOTES
[facility]  Obstetric Progress Note    Patient Name: Jer Belcher  :  1990  MRN:  [id number]  Hospital Day: 1  EGA: 30w2d      Subjective   Doing well.  Good FM.  No further vb-- some brown mucus w/ wiping.  No cramping.  Had few ctxs last night so started procardia this am.  On monitor now-- reactive, no contractions.          Objective     VS:  Vital Signs Range for the last 24 hours  Temperature: Temp:  [98.2 °F (36.8 °C)-98.4 °F (36.9 °C)] 98.2 °F (36.8 °C)   Temp Source: Temp src: Oral   BP: BP: (106-131)/(61-73) 107/65   Pulse: Heart Rate:  [74-99] 74   Respirations: Resp:  [16-18] 16                   Physical Examination:     General :  Alert in NAD  Abdomen: Gravid, Fundus - NT       Lab results reviewed:  CBC:   Lab Results   Component Value Date    WBC 11.05 (H) 2019    HGB 12.1 2019    HCT 36.9 2019            A/P: 30w2d PTL-- stable now.  On procardia.  Hx PTD w/ G1 at 31wk.         Pregnancy              Meseret Landis, APRN  3/13/2019  9:41 AM

## 2019-05-08 ENCOUNTER — ANESTHESIA EVENT (OUTPATIENT)
Dept: LABOR AND DELIVERY | Facility: HOSPITAL | Age: 29
End: 2019-05-08

## 2019-05-08 ENCOUNTER — ANESTHESIA (OUTPATIENT)
Dept: LABOR AND DELIVERY | Facility: HOSPITAL | Age: 29
End: 2019-05-08

## 2019-05-08 ENCOUNTER — HOSPITAL ENCOUNTER (INPATIENT)
Facility: HOSPITAL | Age: 29
LOS: 3 days | Discharge: HOME | End: 2019-05-11
Attending: OBSTETRICS & GYNECOLOGY | Admitting: OBSTETRICS & GYNECOLOGY

## 2019-05-08 LAB
ABO GROUP BLD: NORMAL
BASOPHILS # BLD AUTO: 0.02 10*3/MM3 (ref 0–0.2)
BASOPHILS NFR BLD AUTO: 0.2 % (ref 0–1.5)
BLD GP AB SCN SERPL QL: NEGATIVE
DEPRECATED RDW RBC AUTO: 46.8 FL (ref 37–54)
EOSINOPHIL # BLD AUTO: 0.09 10*3/MM3 (ref 0–0.4)
EOSINOPHIL NFR BLD AUTO: 0.7 % (ref 0.3–6.2)
ERYTHROCYTE [DISTWIDTH] IN BLOOD BY AUTOMATED COUNT: 13.4 % (ref 12.3–15.4)
HCT VFR BLD AUTO: 37.6 % (ref 34–46.6)
HGB BLD-MCNC: 12.6 G/DL (ref 12–15.9)
IMM GRANULOCYTES # BLD AUTO: 0.14 10*3/MM3 (ref 0–0.05)
IMM GRANULOCYTES NFR BLD AUTO: 1.2 % (ref 0–0.5)
LYMPHOCYTES # BLD AUTO: 3 10*3/MM3 (ref 0.7–3.1)
LYMPHOCYTES NFR BLD AUTO: 24.7 % (ref 19.6–45.3)
MCH RBC QN AUTO: 32.1 PG (ref 26.6–33)
MCHC RBC AUTO-ENTMCNC: 33.5 G/DL (ref 31.5–35.7)
MCV RBC AUTO: 95.9 FL (ref 79–97)
MONOCYTES # BLD AUTO: 0.9 10*3/MM3 (ref 0.1–0.9)
MONOCYTES NFR BLD AUTO: 7.4 % (ref 5–12)
NEUTROPHILS # BLD AUTO: 8.01 10*3/MM3 (ref 1.7–7)
NEUTROPHILS NFR BLD AUTO: 65.8 % (ref 42.7–76)
NRBC BLD AUTO-RTO: 0 /100 WBC (ref 0–0.2)
PLATELET # BLD AUTO: 225 10*3/MM3 (ref 140–450)
PMV BLD AUTO: 11.5 FL (ref 6–12)
RBC # BLD AUTO: 3.92 10*6/MM3 (ref 3.77–5.28)
RH BLD: POSITIVE
T&S EXPIRATION DATE: NORMAL
WBC NRBC COR # BLD: 12.16 10*3/MM3 (ref 3.4–10.8)

## 2019-05-08 PROCEDURE — 25010000002 MORPHINE PER 10 MG: Performed by: ANESTHESIOLOGY

## 2019-05-08 PROCEDURE — 86850 RBC ANTIBODY SCREEN: CPT | Performed by: OBSTETRICS & GYNECOLOGY

## 2019-05-08 PROCEDURE — 86901 BLOOD TYPING SEROLOGIC RH(D): CPT | Performed by: OBSTETRICS & GYNECOLOGY

## 2019-05-08 PROCEDURE — 25010000002 PHENYLEPHRINE PER 1 ML: Performed by: ANESTHESIOLOGY

## 2019-05-08 PROCEDURE — 25010000002 ONDANSETRON PER 1 MG: Performed by: ANESTHESIOLOGY

## 2019-05-08 PROCEDURE — 86900 BLOOD TYPING SEROLOGIC ABO: CPT | Performed by: OBSTETRICS & GYNECOLOGY

## 2019-05-08 PROCEDURE — 25010000003 CEFAZOLIN IN DEXTROSE 2-4 GM/100ML-% SOLUTION: Performed by: OBSTETRICS & GYNECOLOGY

## 2019-05-08 PROCEDURE — 25010000002 FENTANYL CITRATE (PF) 100 MCG/2ML SOLUTION: Performed by: ANESTHESIOLOGY

## 2019-05-08 PROCEDURE — 85025 COMPLETE CBC W/AUTO DIFF WBC: CPT | Performed by: OBSTETRICS & GYNECOLOGY

## 2019-05-08 RX ORDER — SODIUM CHLORIDE 0.9 % (FLUSH) 0.9 %
1-10 SYRINGE (ML) INJECTION AS NEEDED
Status: DISCONTINUED | OUTPATIENT
Start: 2019-05-08 | End: 2019-05-09 | Stop reason: HOSPADM

## 2019-05-08 RX ORDER — SODIUM CHLORIDE 0.9 % (FLUSH) 0.9 %
3 SYRINGE (ML) INJECTION EVERY 12 HOURS SCHEDULED
Status: DISCONTINUED | OUTPATIENT
Start: 2019-05-08 | End: 2019-05-09 | Stop reason: HOSPADM

## 2019-05-08 RX ORDER — FENTANYL CITRATE 50 UG/ML
INJECTION, SOLUTION INTRAMUSCULAR; INTRAVENOUS
Status: COMPLETED | OUTPATIENT
Start: 2019-05-08 | End: 2019-05-08

## 2019-05-08 RX ORDER — PROMETHAZINE HYDROCHLORIDE 25 MG/ML
12.5 INJECTION, SOLUTION INTRAMUSCULAR; INTRAVENOUS EVERY 4 HOURS PRN
Status: DISCONTINUED | OUTPATIENT
Start: 2019-05-08 | End: 2019-05-09 | Stop reason: HOSPADM

## 2019-05-08 RX ORDER — CARBOPROST TROMETHAMINE 250 UG/ML
250 INJECTION, SOLUTION INTRAMUSCULAR AS NEEDED
Status: DISCONTINUED | OUTPATIENT
Start: 2019-05-08 | End: 2019-05-09 | Stop reason: HOSPADM

## 2019-05-08 RX ORDER — PROMETHAZINE HYDROCHLORIDE 25 MG/1
12.5 TABLET ORAL EVERY 4 HOURS PRN
Status: DISCONTINUED | OUTPATIENT
Start: 2019-05-08 | End: 2019-05-09 | Stop reason: HOSPADM

## 2019-05-08 RX ORDER — METHYLERGONOVINE MALEATE 0.2 MG/ML
200 INJECTION INTRAVENOUS AS NEEDED
Status: DISCONTINUED | OUTPATIENT
Start: 2019-05-08 | End: 2019-05-09 | Stop reason: HOSPADM

## 2019-05-08 RX ORDER — PROMETHAZINE HYDROCHLORIDE 12.5 MG/1
12.5 SUPPOSITORY RECTAL EVERY 4 HOURS PRN
Status: DISCONTINUED | OUTPATIENT
Start: 2019-05-08 | End: 2019-05-09 | Stop reason: HOSPADM

## 2019-05-08 RX ORDER — FENTANYL CITRATE 50 UG/ML
INJECTION, SOLUTION INTRAMUSCULAR; INTRAVENOUS
Status: COMPLETED
Start: 2019-05-08 | End: 2019-05-08

## 2019-05-08 RX ORDER — FAMOTIDINE 10 MG/ML
20 INJECTION, SOLUTION INTRAVENOUS ONCE AS NEEDED
Status: COMPLETED | OUTPATIENT
Start: 2019-05-08 | End: 2019-05-08

## 2019-05-08 RX ORDER — LIDOCAINE HYDROCHLORIDE 10 MG/ML
5 INJECTION, SOLUTION EPIDURAL; INFILTRATION; INTRACAUDAL; PERINEURAL AS NEEDED
Status: DISCONTINUED | OUTPATIENT
Start: 2019-05-08 | End: 2019-05-09 | Stop reason: HOSPADM

## 2019-05-08 RX ORDER — MISOPROSTOL 200 UG/1
800 TABLET ORAL AS NEEDED
Status: DISCONTINUED | OUTPATIENT
Start: 2019-05-08 | End: 2019-05-09 | Stop reason: HOSPADM

## 2019-05-08 RX ORDER — MORPHINE SULFATE 1 MG/ML
INJECTION, SOLUTION EPIDURAL; INTRATHECAL; INTRAVENOUS
Status: COMPLETED
Start: 2019-05-08 | End: 2019-05-08

## 2019-05-08 RX ORDER — SODIUM CHLORIDE, SODIUM LACTATE, POTASSIUM CHLORIDE, CALCIUM CHLORIDE 600; 310; 30; 20 MG/100ML; MG/100ML; MG/100ML; MG/100ML
125 INJECTION, SOLUTION INTRAVENOUS CONTINUOUS
Status: DISCONTINUED | OUTPATIENT
Start: 2019-05-08 | End: 2019-05-09

## 2019-05-08 RX ORDER — CEFAZOLIN SODIUM 2 G/100ML
2 INJECTION, SOLUTION INTRAVENOUS ONCE
Status: COMPLETED | OUTPATIENT
Start: 2019-05-08 | End: 2019-05-08

## 2019-05-08 RX ORDER — OXYTOCIN-SODIUM CHLORIDE 0.9% IV SOLN 30 UNIT/500ML 30-0.9/5 UT/ML-%
125 SOLUTION INTRAVENOUS CONTINUOUS PRN
Status: COMPLETED | OUTPATIENT
Start: 2019-05-08 | End: 2019-05-08

## 2019-05-08 RX ORDER — OXYTOCIN-SODIUM CHLORIDE 0.9% IV SOLN 30 UNIT/500ML 30-0.9/5 UT/ML-%
250 SOLUTION INTRAVENOUS CONTINUOUS
Status: ACTIVE | OUTPATIENT
Start: 2019-05-08 | End: 2019-05-08

## 2019-05-08 RX ORDER — BUPIVACAINE HYDROCHLORIDE 7.5 MG/ML
INJECTION, SOLUTION EPIDURAL; RETROBULBAR
Status: COMPLETED | OUTPATIENT
Start: 2019-05-08 | End: 2019-05-08

## 2019-05-08 RX ORDER — IBUPROFEN 800 MG/1
800 TABLET ORAL EVERY 8 HOURS PRN
Status: CANCELLED | OUTPATIENT
Start: 2019-05-08

## 2019-05-08 RX ORDER — BUPIVACAINE HYDROCHLORIDE 7.5 MG/ML
INJECTION, SOLUTION INTRASPINAL
Status: DISPENSED
Start: 2019-05-08 | End: 2019-05-09

## 2019-05-08 RX ORDER — PROMETHAZINE HYDROCHLORIDE 25 MG/ML
25 INJECTION, SOLUTION INTRAMUSCULAR; INTRAVENOUS EVERY 4 HOURS PRN
Status: DISCONTINUED | OUTPATIENT
Start: 2019-05-08 | End: 2019-05-09 | Stop reason: HOSPADM

## 2019-05-08 RX ORDER — PRENATAL VIT/IRON FUM/FOLIC AC 27MG-0.8MG
TABLET ORAL DAILY
COMMUNITY
End: 2019-10-14

## 2019-05-08 RX ORDER — ONDANSETRON 2 MG/ML
4 INJECTION INTRAMUSCULAR; INTRAVENOUS ONCE AS NEEDED
Status: COMPLETED | OUTPATIENT
Start: 2019-05-08 | End: 2019-05-08

## 2019-05-08 RX ORDER — ACETAMINOPHEN 500 MG
1000 TABLET ORAL ONCE
Status: COMPLETED | OUTPATIENT
Start: 2019-05-08 | End: 2019-05-08

## 2019-05-08 RX ORDER — MORPHINE SULFATE 1 MG/ML
INJECTION, SOLUTION EPIDURAL; INTRATHECAL; INTRAVENOUS
Status: COMPLETED | OUTPATIENT
Start: 2019-05-08 | End: 2019-05-08

## 2019-05-08 RX ORDER — OXYTOCIN-SODIUM CHLORIDE 0.9% IV SOLN 30 UNIT/500ML 30-0.9/5 UT/ML-%
999 SOLUTION INTRAVENOUS ONCE
Status: COMPLETED | OUTPATIENT
Start: 2019-05-08 | End: 2019-05-08

## 2019-05-08 RX ORDER — OXYTOCIN-SODIUM CHLORIDE 0.9% IV SOLN 30 UNIT/500ML 30-0.9/5 UT/ML-%
SOLUTION INTRAVENOUS
Status: COMPLETED
Start: 2019-05-08 | End: 2019-05-08

## 2019-05-08 RX ORDER — PHYTONADIONE 1 MG/.5ML
INJECTION, EMULSION INTRAMUSCULAR; INTRAVENOUS; SUBCUTANEOUS
Status: DISPENSED
Start: 2019-05-08 | End: 2019-05-09

## 2019-05-08 RX ORDER — ERYTHROMYCIN 5 MG/G
OINTMENT OPHTHALMIC
Status: DISPENSED
Start: 2019-05-08 | End: 2019-05-09

## 2019-05-08 RX ADMIN — OXYTOCIN-SODIUM CHLORIDE 0.9% IV SOLN 30 UNIT/500ML 999 ML/HR: 30-0.9/5 SOLUTION at 22:21

## 2019-05-08 RX ADMIN — SODIUM CHLORIDE, POTASSIUM CHLORIDE, SODIUM LACTATE AND CALCIUM CHLORIDE: 600; 310; 30; 20 INJECTION, SOLUTION INTRAVENOUS at 21:55

## 2019-05-08 RX ADMIN — ACETAMINOPHEN 1000 MG: 500 TABLET, FILM COATED ORAL at 21:26

## 2019-05-08 RX ADMIN — SODIUM CHLORIDE, POTASSIUM CHLORIDE, SODIUM LACTATE AND CALCIUM CHLORIDE 1000 ML: 600; 310; 30; 20 INJECTION, SOLUTION INTRAVENOUS at 21:38

## 2019-05-08 RX ADMIN — BUPIVACAINE HYDROCHLORIDE 1.8 ML: 7.5 INJECTION, SOLUTION EPIDURAL; RETROBULBAR at 22:08

## 2019-05-08 RX ADMIN — ONDANSETRON 4 MG: 2 INJECTION INTRAMUSCULAR; INTRAVENOUS at 21:31

## 2019-05-08 RX ADMIN — PHENYLEPHRINE HYDROCHLORIDE 100 MCG: 10 INJECTION INTRAVENOUS at 22:10

## 2019-05-08 RX ADMIN — OXYTOCIN 125 ML/HR: 10 INJECTION INTRAVENOUS at 23:58

## 2019-05-08 RX ADMIN — MORPHINE SULFATE 25 MCG: 1 INJECTION EPIDURAL; INTRATHECAL; INTRAVENOUS at 22:08

## 2019-05-08 RX ADMIN — OXYTOCIN 999 ML/HR: 10 INJECTION INTRAVENOUS at 22:21

## 2019-05-08 RX ADMIN — FENTANYL CITRATE 10 MCG: 50 INJECTION INTRAMUSCULAR; INTRAVENOUS at 22:08

## 2019-05-08 RX ADMIN — CEFAZOLIN SODIUM 2 G: 2 INJECTION, SOLUTION INTRAVENOUS at 21:39

## 2019-05-08 RX ADMIN — PHENYLEPHRINE HYDROCHLORIDE 100 MCG: 10 INJECTION INTRAVENOUS at 22:00

## 2019-05-08 RX ADMIN — FAMOTIDINE 20 MG: 10 INJECTION, SOLUTION INTRAVENOUS at 21:28

## 2019-05-09 PROBLEM — Z34.90 PREGNANCY: Status: RESOLVED | Noted: 2019-02-10 | Resolved: 2019-05-09

## 2019-05-09 LAB
BASOPHILS # BLD AUTO: 0.04 10*3/MM3 (ref 0–0.2)
BASOPHILS NFR BLD AUTO: 0.3 % (ref 0–1.5)
DEPRECATED RDW RBC AUTO: 46.8 FL (ref 37–54)
EOSINOPHIL # BLD AUTO: 0.06 10*3/MM3 (ref 0–0.4)
EOSINOPHIL NFR BLD AUTO: 0.5 % (ref 0.3–6.2)
ERYTHROCYTE [DISTWIDTH] IN BLOOD BY AUTOMATED COUNT: 13.4 % (ref 12.3–15.4)
HCT VFR BLD AUTO: 35.3 % (ref 34–46.6)
HGB BLD-MCNC: 11.8 G/DL (ref 12–15.9)
IMM GRANULOCYTES # BLD AUTO: 0.09 10*3/MM3 (ref 0–0.05)
IMM GRANULOCYTES NFR BLD AUTO: 0.8 % (ref 0–0.5)
LYMPHOCYTES # BLD AUTO: 2.07 10*3/MM3 (ref 0.7–3.1)
LYMPHOCYTES NFR BLD AUTO: 17.9 % (ref 19.6–45.3)
MCH RBC QN AUTO: 32 PG (ref 26.6–33)
MCHC RBC AUTO-ENTMCNC: 33.4 G/DL (ref 31.5–35.7)
MCV RBC AUTO: 95.7 FL (ref 79–97)
MONOCYTES # BLD AUTO: 0.66 10*3/MM3 (ref 0.1–0.9)
MONOCYTES NFR BLD AUTO: 5.7 % (ref 5–12)
NEUTROPHILS # BLD AUTO: 8.62 10*3/MM3 (ref 1.7–7)
NEUTROPHILS NFR BLD AUTO: 74.8 % (ref 42.7–76)
NRBC BLD AUTO-RTO: 0 /100 WBC (ref 0–0.2)
PLATELET # BLD AUTO: 152 10*3/MM3 (ref 140–450)
PMV BLD AUTO: 11 FL (ref 6–12)
RBC # BLD AUTO: 3.69 10*6/MM3 (ref 3.77–5.28)
WBC NRBC COR # BLD: 11.54 10*3/MM3 (ref 3.4–10.8)

## 2019-05-09 PROCEDURE — 85025 COMPLETE CBC W/AUTO DIFF WBC: CPT | Performed by: OBSTETRICS & GYNECOLOGY

## 2019-05-09 RX ORDER — DIPHENHYDRAMINE HYDROCHLORIDE 50 MG/ML
25 INJECTION INTRAMUSCULAR; INTRAVENOUS EVERY 4 HOURS PRN
Status: DISCONTINUED | OUTPATIENT
Start: 2019-05-09 | End: 2019-05-11 | Stop reason: HOSPADM

## 2019-05-09 RX ORDER — SIMETHICONE 80 MG
80 TABLET,CHEWABLE ORAL 4 TIMES DAILY PRN
Status: DISCONTINUED | OUTPATIENT
Start: 2019-05-09 | End: 2019-05-11 | Stop reason: HOSPADM

## 2019-05-09 RX ORDER — PRENATAL VIT NO.126/IRON/FOLIC 28MG-0.8MG
1 TABLET ORAL DAILY
Status: DISCONTINUED | OUTPATIENT
Start: 2019-05-09 | End: 2019-05-11 | Stop reason: HOSPADM

## 2019-05-09 RX ORDER — OXYCODONE HYDROCHLORIDE AND ACETAMINOPHEN 5; 325 MG/1; MG/1
1 TABLET ORAL EVERY 4 HOURS PRN
Status: DISCONTINUED | OUTPATIENT
Start: 2019-05-09 | End: 2019-05-11 | Stop reason: HOSPADM

## 2019-05-09 RX ORDER — ONDANSETRON 2 MG/ML
4 INJECTION INTRAMUSCULAR; INTRAVENOUS ONCE AS NEEDED
Status: DISCONTINUED | OUTPATIENT
Start: 2019-05-09 | End: 2019-05-11 | Stop reason: HOSPADM

## 2019-05-09 RX ORDER — IBUPROFEN 600 MG/1
600 TABLET ORAL EVERY 8 HOURS PRN
Status: DISCONTINUED | OUTPATIENT
Start: 2019-05-09 | End: 2019-05-10

## 2019-05-09 RX ORDER — ONDANSETRON 4 MG/1
4 TABLET, FILM COATED ORAL EVERY 8 HOURS PRN
Status: DISCONTINUED | OUTPATIENT
Start: 2019-05-09 | End: 2019-05-11 | Stop reason: HOSPADM

## 2019-05-09 RX ORDER — OXYCODONE AND ACETAMINOPHEN 10; 325 MG/1; MG/1
1 TABLET ORAL EVERY 4 HOURS PRN
Status: DISCONTINUED | OUTPATIENT
Start: 2019-05-09 | End: 2019-05-11 | Stop reason: HOSPADM

## 2019-05-09 RX ORDER — ACETAMINOPHEN 325 MG/1
650 TABLET ORAL EVERY 4 HOURS PRN
Status: DISCONTINUED | OUTPATIENT
Start: 2019-05-09 | End: 2019-05-11 | Stop reason: HOSPADM

## 2019-05-09 RX ORDER — DIPHENHYDRAMINE HCL 25 MG
25 CAPSULE ORAL EVERY 4 HOURS PRN
Status: DISCONTINUED | OUTPATIENT
Start: 2019-05-09 | End: 2019-05-11 | Stop reason: HOSPADM

## 2019-05-09 RX ORDER — HYDROMORPHONE HYDROCHLORIDE 1 MG/ML
0.5 INJECTION, SOLUTION INTRAMUSCULAR; INTRAVENOUS; SUBCUTANEOUS
Status: ACTIVE | OUTPATIENT
Start: 2019-05-09 | End: 2019-05-10

## 2019-05-09 RX ORDER — DOCUSATE SODIUM 100 MG/1
100 CAPSULE, LIQUID FILLED ORAL 2 TIMES DAILY PRN
Status: DISCONTINUED | OUTPATIENT
Start: 2019-05-09 | End: 2019-05-11 | Stop reason: HOSPADM

## 2019-05-09 RX ORDER — MORPHINE SULFATE 2 MG/ML
2 INJECTION, SOLUTION INTRAMUSCULAR; INTRAVENOUS
Status: ACTIVE | OUTPATIENT
Start: 2019-05-09 | End: 2019-05-10

## 2019-05-09 RX ORDER — LANOLIN
CREAM (ML) TOPICAL
Status: DISCONTINUED | OUTPATIENT
Start: 2019-05-09 | End: 2019-05-11 | Stop reason: HOSPADM

## 2019-05-09 RX ADMIN — ACETAMINOPHEN 650 MG: 325 TABLET, FILM COATED ORAL at 06:51

## 2019-05-09 RX ADMIN — ACETAMINOPHEN 650 MG: 325 TABLET, FILM COATED ORAL at 02:23

## 2019-05-09 RX ADMIN — ACETAMINOPHEN 650 MG: 325 TABLET, FILM COATED ORAL at 10:49

## 2019-05-09 RX ADMIN — ACETAMINOPHEN 650 MG: 325 TABLET, FILM COATED ORAL at 22:33

## 2019-05-09 RX ADMIN — IBUPROFEN 600 MG: 600 TABLET ORAL at 10:49

## 2019-05-09 RX ADMIN — IBUPROFEN 600 MG: 600 TABLET ORAL at 18:51

## 2019-05-09 RX ADMIN — DOCUSATE SODIUM 100 MG: 100 CAPSULE, LIQUID FILLED ORAL at 09:04

## 2019-05-09 RX ADMIN — IBUPROFEN 600 MG: 600 TABLET ORAL at 02:23

## 2019-05-09 RX ADMIN — Medication: at 02:23

## 2019-05-09 RX ADMIN — ACETAMINOPHEN 650 MG: 325 TABLET, FILM COATED ORAL at 18:03

## 2019-05-09 NOTE — PLAN OF CARE
Problem: Patient Care Overview  Goal: Plan of Care Review  Outcome: Ongoing (interventions implemented as appropriate)   19 0305   Coping/Psychosocial   Plan of Care Reviewed With patient   Plan of Care Review   Progress improving   OTHER   Outcome Summary V/S stable, uterus and bleeding WNL, catheter in place, good UOP, breastfeeding infant     Goal: Individualization and Mutuality  Outcome: Ongoing (interventions implemented as appropriate)    Goal: Discharge Needs Assessment  Outcome: Ongoing (interventions implemented as appropriate)      Problem: Fall Risk,  (Adult,Obstetrics,Pediatric)  Goal: Identify Related Risk Factors and Signs and Symptoms  Outcome: Ongoing (interventions implemented as appropriate)    Goal: Absence of Maternal Fall  Outcome: Ongoing (interventions implemented as appropriate)    Goal: Absence of  Fall/Drop  Outcome: Ongoing (interventions implemented as appropriate)      Problem:  Delivery (Adult,Obstetrics,Pediatric)  Goal: Signs and Symptoms of Listed Potential Problems Will be Absent, Minimized or Managed ( Delivery)  Outcome: Ongoing (interventions implemented as appropriate)      Problem: Breastfeeding (Adult,Obstetrics,Pediatric)  Goal: Signs and Symptoms of Listed Potential Problems Will be Absent, Minimized or Managed (Breastfeeding)  Outcome: Ongoing (interventions implemented as appropriate)

## 2019-05-09 NOTE — PROGRESS NOTES
[facility]   PROGRESS NOTE    Patient Name: Jer Belcher  :  1990  MRN:  [id number]      Post-Op Day 1 S/P    Delivered a female infant.  Subjective     Patient reports:    Pain is well controlled. Voiding and ambulating without difficulty.    Tolerating po. Lochia normal.       The patient plans to breastfeed.         Objective       Vitals: Vital Signs Range for the last 24 hours  Temperature: Temp:  [97.1 °F (36.2 °C)-98.8 °F (37.1 °C)] 98.8 °F (37.1 °C)   Temp Source: Temp src: Oral   BP: BP: ()/(58-81) 112/71   Pulse: Heart Rate:  [69-95] 91   Respirations: Resp:  [16-18] 16         Intake/Output Summary (Last 24 hours) at 2019 1011  Last data filed at 2019 0550  Gross per 24 hour   Intake 1857 ml   Output 1450 ml   Net 407 ml                                              Physical Exam     General Alert and awake, in NAD      CV Regular rate and rhythm      Lungs clear to auscultation bilaterally        Abdomen Soft, non-distended, fundus firm,  1 below umbilicus, appropriately tender; F/c w/ clear yellow urine      Incision  Dressing clean, dry and intact.      Extremities  tr edema, calves NT               LABS: Results from last 7 days   Lab Units 19  0733 19  2110   WBC 10*3/mm3 11.54* 12.16*   HEMOGLOBIN g/dL 11.8* 12.6   HEMATOCRIT % 35.3 37.6   PLATELETS 10*3/mm3 152 225         Prenatal labs results reviewed:  Yes   Rubella:  Immune  Rh Status:    RH type   Date Value Ref Range Status   2019 Positive  Final                       Assessment/Plan   : 1. POD 1 S/P C/S: Hemodynamically stable.  Doing well.  Continue routine care.       * No active hospital problems. *          Meseret Landis, APRN  2019  10:11 AM

## 2019-05-09 NOTE — ANESTHESIA POSTPROCEDURE EVALUATION
"Patient: Jer Belcher    Procedure Summary     Date:  19 Room / Location:   [facility]    Anesthesia Start:   Anesthesia Stop:      Procedure:   SECTION REPEAT (Abdomen) Diagnosis:      Surgeon:  Tami Oviedo MD Provider:  Martha Jesus MD    Anesthesia Type:  spinal ASA Status:  2          Anesthesia Type: spinal  Last vitals  BP   109/61 (19 2349)   Temp   36.3 °C (97.4 °F) (19 2315)   Pulse   73 (19 2350)   Resp   16 (195)     SpO2   97 % (19)     Post Anesthesia Care and Evaluation    Patient location during evaluation: bedside  Patient participation: complete - patient participated  Level of consciousness: awake and alert  Pain management: adequate  Airway patency: patent  Anesthetic complications: No anesthetic complications    Cardiovascular status: acceptable  Respiratory status: acceptable  Hydration status: acceptable    Comments: /61   Pulse 73   Temp 36.3 °C (97.4 °F) (Oral)   Resp 16   Ht 165.1 cm (65\")   SpO2 97%   Breastfeeding? Yes   BMI 27.29 kg/m²           "

## 2019-05-09 NOTE — ANESTHESIA PREPROCEDURE EVALUATION
Anesthesia Evaluation     no history of anesthetic complications:               Airway   Mallampati: II  TM distance: >3 FB  Neck ROM: full  Dental - normal exam     Pulmonary    (-) asthma, shortness of breath, recent URI, not a smoker  Cardiovascular     (+) valvular problems/murmurs murmur,   (-) hypertension      Neuro/Psych  (-) seizures, dizziness/light headedness, syncope  GI/Hepatic/Renal/Endo    (-) liver disease, no renal disease    Musculoskeletal     Abdominal    Substance History      OB/GYN    (+) Pregnant (36),         Other                      Anesthesia Plan    ASA 2     spinal     intravenous induction   Anesthetic plan, all risks, benefits, and alternatives have been provided, discussed and informed consent has been obtained with: patient.

## 2019-05-09 NOTE — OP NOTE
[facility]   Section Operative Note    Patient Name: Jre Belcher  :  1990  MRN:  [id number]      Date of procedure:  2019        Pre-Operative Dx:   1.  IUP at 38w2d weeks   2. Prior  section  3. Labor        Postoperative Dx:  Same        Procedure: , Low Transverse      Surgeon: Tami Oviedo MD      Assistant: Dr. Casaino     Anesthesia: Spinal    EBL: 800cc         Specimens: None     Findings:                           Amniotic Fluid clear  Placenta Intact, 3 VC  Uterus normal  Tubes and ovaries normal bilaterally              Infant:            Gender: Viable female  infant     Weight: 2925 g (6 lb 7.2 oz)     Apgars: 9   @ 1 minute /     9   @ 5 minutes                 Indication for C/Section:     Patient Choice                Procedure Details:  The patient was taken to the operating room where spinal anesthesia was found to be adequate. She was prepped and draped in the usual sterile manner in the dorsal supine position with leftward tilt. A Pfannenstiel incision was made and carried down to the fascia. The fascia was incised in the mid-line and extended transversely with Boyd scissors. The fascia was grasped and elevated and  from the underlying rectus muscles superiorly and inferiorly. The peritoneum was identified and entered. Peritoneal incision was extended superiorly and inferiorly with good visualization of the bladder. The bladder blade was inserted and the vesicouterine peritoneum was incised transversely and the bladder flap was created digitally. The bladder blade was reinserted. The  lower uterine segment was incised in a transverse fashion.  The head was elevated and delivered through the incision. The remainder of the infant was delivered without difficulty. The umbilical cord was clamped and cut and the infant was handed off the field. Cord ph and cord bloods were obtained. The placenta was removed intact and appeared normal. The uterine  incision was closed with running locked sutures of 0 chromic. Second layer closure was placed imbricating the first for hemostasis. The abdominal cavity was irrigated and cleared of all clot and debris. The uterine incision was inspected and noted to be hemostatic. Rectus muscles were reapproximated in the midline with 0 vicryl.  The fascia was closed with 0 vicryl in running continuous fashion. The subcutaneous tissue was closed with 3-0 vicryl. The skin was closed in subcuticular fashion with 4-0 Vicryl.   Instrument, sponge, and needle counts were correct prior the abdominal closure and at the conclusion of the case. Mother and baby  to recovery room in stable condition.              Complications:     None          Antibiotics: cefazolin (Ancef)                      Tami Oviedo MD  5/8/2019  10:54 PM

## 2019-05-09 NOTE — LACTATION NOTE
Mom reports baby is nursing well so far. She denies questions at this time. Encouraged to call if needing assistance.       Lactation Consult Note    Evaluation Completed: 2019 10:00 AM  Patient Name: Jer Belcher  :  1990  MRN:  [id number]     REFERRAL  INFORMATION:                                         DELIVERY HISTORY:          Skin to skin initiation date/time: 2019  11:07 PM   Skin to skin end date/time:              MATERNAL ASSESSMENT:                               INFANT ASSESSMENT:  Information for the patient's :  Teresita Belcher [[id number]]   No past medical history on file.                                                                                                                                MATERNAL INFANT FEEDING:                                                                       EQUIPMENT TYPE:                                 BREAST PUMPING:          LACTATION REFERRALS:

## 2019-05-09 NOTE — H&P
"[facility]  Obstetric History and Physical    Patient Name: Jer Belcher  :  1990  MRN:  [id number]      Chief Complaint   Patient presents with   • Contractions     having ctx for \"past few days\" but have increased in intensity for past two hours.  denies LOF, vag bleeding.  +FM        Subjective     Patient is a 28 y.o. female  currently at 38w2d, who presents in early labor, requesting repeat  delivery.       Her prenatal care has been  complicated by h/o PTD at 31 weeks and episode of PTL this pregnancy around 30 weeks s/p BMZ, Mg, and procardia.       Objective       Vital Signs Range for the last 24 hours  Temperature: Temp:  [98.8 °F (37.1 °C)] 98.8 °F (37.1 °C)   Temp Source: Temp src: Oral   BP: BP: (128-134)/(74-75) 128/75   Pulse: Heart Rate:  [75-77] 77   Respirations: Resp:  [16] 16                   Physical Examination:     General :  Alert in NAD  Abdomen: Gravid, EFW 7lbs by leopolds    Presentation: ceph   Cervix: Exam by: Method: sterile exam per RN   Dilation: Cervical Dilation (cm): 1-2   Effacement: Cervical Effacement: 70%   Station: Fetal Station: -1       Fetal Heart Rate Assessment reactive, cat I    Uterine Assessment UCs q2-3min          Assessment/Plan     1.  Intrauterine pregnancy at 38w2d weeks gestation in labor requesting repeat  delivery.   reactive fetal status.   RIsks reviewed, consent obtained, will proceed with repeat  delivery.  Plan of care has been reviewed with patient and family.  All questions answered.      * No active hospital problems. *        H&P updated. The patient was examined and the following changes are noted above.         Tami Oviedo MD  2019  8:36 PM      "

## 2019-05-09 NOTE — ANESTHESIA PROCEDURE NOTES
Spinal Block      Start Time: 5/8/2019 10:07 PM  Performed By  Anesthesiologist: Martha Jesus MD  Preanesthetic Checklist  Completed: patient identified, site marked, surgical consent, pre-op evaluation, timeout performed, IV checked, risks and benefits discussed and monitors and equipment checked  Spinal Block Prep:  Patient Position:sitting  Sterile Tech:cap, gloves and sterile barriers  Patient Monitoring:EKG, continuous pulse oximetry and blood pressure monitoring  Spinal Block Procedure  Approach:midline  Guidance:landmark technique  Location:L3-L4  Needle Type:Sprotte  Needle Gauge:24 G  Paresthesia: no  Fluid Appearance:clear  Medications: fentaNYL citrate (PF) (SUBLIMAZE) injection, 10 mcg  Morphine PF injection, 25 mcg  bupivacaine PF (MARCAINE) 0.75 % injection, 1.8 mL  Med Administered at 5/8/2019 10:08 PM   Post Assessment  Patient Tolerance:patient tolerated the procedure well with no apparent complications  Complications no  Additional Notes  Easily placed

## 2019-05-09 NOTE — PLAN OF CARE
Problem: Patient Care Overview  Goal: Plan of Care Review  Outcome: Ongoing (interventions implemented as appropriate)   19   Coping/Psychosocial   Plan of Care Reviewed With patient;spouse   Plan of Care Review   Progress improving   OTHER   Outcome Summary  went well, recovering well, has done SIDNEY and attempted breastfeeding     Goal: Individualization and Mutuality  Outcome: Ongoing (interventions implemented as appropriate)   19   Individualization   Patient Specific Preferences SIDNEY, breastfeed   Patient Specific Goals (Include Timeframe) pain control   Patient Specific Interventions spinal   Mutuality/Individual Preferences   What Anxieties, Fears, Concerns, or Questions Do You Have About Your Care? anxious about c/s   What Information Would Help Us Give You More Personalized Care? 1st delivery was 31 weeks, emergency c/s   How Would You and/or Your Support Person Like to Participate in Your Care? keep involved in plan of care   Mutuality/Individual Preferences   How to Address Anxieties/Fears discuss plan of care, distraction techniques     Goal: Discharge Needs Assessment  Outcome: Ongoing (interventions implemented as appropriate)   19   Discharge Needs Assessment   Readmission Within the Last 30 Days no previous admission in last 30 days   Concerns to be Addressed no discharge needs identified   Patient/Family Anticipates Transition to home with family   Patient/Family Anticipated Services at Transition none   Transportation Concerns car, none   Transportation Anticipated family or friend will provide   Anticipated Changes Related to Illness none   Equipment Needed After Discharge none   Disability   Equipment Currently Used at Home none     Goal: Interprofessional Rounds/Family Conf  Outcome: Ongoing (interventions implemented as appropriate)   19   Interdisciplinary Rounds/Family Conf   Participants family;nursing;patient;physician       Problem: Fall  Risk,  (Adult,Obstetrics,Pediatric)  Goal: Identify Related Risk Factors and Signs and Symptoms  Outcome: Ongoing (interventions implemented as appropriate)   19   Fall Risk,  (Adult,Obstetrics,Pediatric)   Related Risk Factors (Fall Risk, ) medication side effects;regional anesthesia   Signs and Symptoms (Fall Risk, ) presence of fall risk factors     Goal: Absence of Maternal Fall  Outcome: Ongoing (interventions implemented as appropriate)   19   Fall Risk,  (Adult,Obstetrics,Pediatric)   Absence of Maternal Fall making progress toward outcome     Goal: Absence of [place] Fall/Drop  Outcome: Ongoing (interventions implemented as appropriate)   19   Fall Risk,  (Adult,Obstetrics,Pediatric)   Absence of [place] Fall/Drop making progress toward outcome       Problem: Skin Injury Risk (Adult)  Goal: Identify Related Risk Factors and Signs and Symptoms  Outcome: Ongoing (interventions implemented as appropriate)   19   Skin Injury Risk (Adult)   Related Risk Factors (Skin Injury Risk) mobility impaired     Goal: Skin Health and Integrity  Outcome: Ongoing (interventions implemented as appropriate)   19   Skin Injury Risk (Adult)   Skin Health and Integrity making progress toward outcome       Problem:  Delivery (Adult,Obstetrics,Pediatric)  Goal: Signs and Symptoms of Listed Potential Problems Will be Absent, Minimized or Managed ( Delivery)  Outcome: Ongoing (interventions implemented as appropriate)   19   Goal/Outcome Evaluation   Problems Assessed ( Delivery) all   Problems Present ( Delivery) none       Problem: Anesthesia/Analgesia, Neuraxial (Obstetrics)  Goal: Signs and Symptoms of Listed Potential Problems Will be Absent, Minimized or Managed (Anesthesia/Analgesia, Neuraxial)  Outcome: Outcome(s) achieved Date Met: 19   Goal/Outcome  Evaluation   Problems Assessed (Neuraxial Anesthesia/Analgesia, OB) all   Problems Present (Neuraxial Anesth OB) none       Problem: Breastfeeding (Adult,Obstetrics,Pediatric)  Goal: Signs and Symptoms of Listed Potential Problems Will be Absent, Minimized or Managed (Breastfeeding)  Outcome: Ongoing (interventions implemented as appropriate)   05/08/19 1717   Goal/Outcome Evaluation   Problems Assessed (Breastfeeding) all   Problems Present (Breastfeeding) none

## 2019-05-10 PROBLEM — O60.03 PRETERM LABOR IN THIRD TRIMESTER WITHOUT DELIVERY: Status: RESOLVED | Noted: 2019-03-13 | Resolved: 2019-05-10

## 2019-05-10 RX ORDER — NALOXONE HCL 0.4 MG/ML
0.2 VIAL (ML) INJECTION
Status: DISCONTINUED | OUTPATIENT
Start: 2019-05-10 | End: 2019-05-11 | Stop reason: HOSPADM

## 2019-05-10 RX ORDER — ONDANSETRON 2 MG/ML
4 INJECTION INTRAMUSCULAR; INTRAVENOUS EVERY 6 HOURS PRN
Status: DISCONTINUED | OUTPATIENT
Start: 2019-05-10 | End: 2019-05-11 | Stop reason: HOSPADM

## 2019-05-10 RX ORDER — IBUPROFEN 600 MG/1
600 TABLET ORAL EVERY 6 HOURS PRN
Status: DISCONTINUED | OUTPATIENT
Start: 2019-05-10 | End: 2019-05-11 | Stop reason: HOSPADM

## 2019-05-10 RX ORDER — ONDANSETRON 4 MG/1
4 TABLET, FILM COATED ORAL EVERY 6 HOURS PRN
Status: DISCONTINUED | OUTPATIENT
Start: 2019-05-10 | End: 2019-05-11 | Stop reason: HOSPADM

## 2019-05-10 RX ADMIN — ACETAMINOPHEN 650 MG: 325 TABLET, FILM COATED ORAL at 08:11

## 2019-05-10 RX ADMIN — IBUPROFEN 600 MG: 600 TABLET ORAL at 04:06

## 2019-05-10 RX ADMIN — SIMETHICONE CHEW TAB 80 MG 80 MG: 80 TABLET ORAL at 08:11

## 2019-05-10 RX ADMIN — DOCUSATE SODIUM 100 MG: 100 CAPSULE, LIQUID FILLED ORAL at 20:13

## 2019-05-10 RX ADMIN — ACETAMINOPHEN 650 MG: 325 TABLET, FILM COATED ORAL at 15:58

## 2019-05-10 RX ADMIN — Medication 1 TABLET: at 10:08

## 2019-05-10 RX ADMIN — IBUPROFEN 600 MG: 600 TABLET ORAL at 15:58

## 2019-05-10 RX ADMIN — SIMETHICONE CHEW TAB 80 MG 80 MG: 80 TABLET ORAL at 20:13

## 2019-05-10 RX ADMIN — OXYCODONE AND ACETAMINOPHEN 1 TABLET: 5; 325 TABLET ORAL at 20:13

## 2019-05-10 RX ADMIN — IBUPROFEN 600 MG: 600 TABLET ORAL at 10:08

## 2019-05-10 RX ADMIN — ACETAMINOPHEN 650 MG: 325 TABLET, FILM COATED ORAL at 04:06

## 2019-05-10 RX ADMIN — DOCUSATE SODIUM 100 MG: 100 CAPSULE, LIQUID FILLED ORAL at 08:11

## 2019-05-10 NOTE — PLAN OF CARE
Problem:  Delivery (Adult,Obstetrics,Pediatric)  Goal: Signs and Symptoms of Listed Potential Problems Will be Absent, Minimized or Managed ( Delivery)  Outcome: Ongoing (interventions implemented as appropriate)   05/10/19 1911   Goal/Outcome Evaluation   Problems Assessed ( Delivery) all   Problems Present ( Delivery) none

## 2019-05-10 NOTE — LACTATION NOTE
This note was copied from a baby's chart.  P2 term baby. Baby sleepy through the night and Mom gave formula otherwise latching well per Mom. Encouraged to call for assistance today. Discussed STS to help baby awaken and breast feed well.

## 2019-05-10 NOTE — PROGRESS NOTES
[facility]   PROGRESS NOTE    Patient Name: Jer Belcher  :  1990  MRN:  [id number]      Post-Op 2 S/P   Subjective     Patient reports:   Doing well. Pain well controlled. Tolerating regular diet and having flatus.    Lochia normal.       Objective       Vitals: Vital Signs Range for the last 24 hours  Temperature: Temp:  [97.7 °F (36.5 °C)-98.6 °F (37 °C)] 98.5 °F (36.9 °C)       BP: BP: (103-116)/(64-74) 106/68   Pulse: Heart Rate:  [73-91] 73   Respirations: Resp:  [16] 16                                         Physical Exam     General Alert       Abdomen Soft, non-distended, fundus firm, 1 below umbilicus, appropriately tender      Incision  Intact, no erythema or exudate      Extremities Calves NT bilaterally                Assessment/Plan  :   POD 2  -  Doing well.  Continue usual cares.           * No active hospital problems. *               Meseret Landis, STEPH  5/10/2019  10:48 AM   Patient seen and examined. Agree with above assessment.     Vilma Kaur MD  5/10/2019  2:07 PM

## 2019-05-11 VITALS
OXYGEN SATURATION: 98 % | RESPIRATION RATE: 16 BRPM | SYSTOLIC BLOOD PRESSURE: 110 MMHG | TEMPERATURE: 97.9 F | BODY MASS INDEX: 27.29 KG/M2 | HEIGHT: 65 IN | HEART RATE: 75 BPM | DIASTOLIC BLOOD PRESSURE: 68 MMHG

## 2019-05-11 RX ORDER — OXYCODONE HYDROCHLORIDE AND ACETAMINOPHEN 5; 325 MG/1; MG/1
2 TABLET ORAL EVERY 4 HOURS PRN
Qty: 30 TABLET | Refills: 0 | Status: SHIPPED | OUTPATIENT
Start: 2019-05-11 | End: 2019-05-14

## 2019-05-11 RX ORDER — IBUPROFEN 600 MG/1
600 TABLET ORAL EVERY 6 HOURS PRN
Qty: 30 TABLET | Refills: 3 | Status: SHIPPED | OUTPATIENT
Start: 2019-05-11 | End: 2019-10-14

## 2019-05-11 RX ADMIN — OXYCODONE AND ACETAMINOPHEN 1 TABLET: 5; 325 TABLET ORAL at 07:50

## 2019-05-11 RX ADMIN — IBUPROFEN 600 MG: 600 TABLET ORAL at 00:28

## 2019-05-11 RX ADMIN — BETAMETHASONE VALERATE: 1.2 OINTMENT TOPICAL at 02:31

## 2019-05-11 RX ADMIN — IBUPROFEN 600 MG: 600 TABLET ORAL at 07:50

## 2019-05-11 RX ADMIN — OXYCODONE AND ACETAMINOPHEN 1 TABLET: 5; 325 TABLET ORAL at 00:28

## 2019-05-11 RX ADMIN — Medication 1 TABLET: at 07:50

## 2019-05-11 NOTE — DISCHARGE SUMMARY
Date of Discharge:  2019    Discharge Diagnosis:     Presenting Problem/History of Present Illness  Pregnancy [Z34.90]       Hospital Course  Patient is a 28 y.o. female presented with labor, repeat . Delivered viable female infant. Hospital course has been uncomplicated. Pt is stable and ready for d/c.     Procedures Performed  Procedure(s):   SECTION REPEAT         Condition on Discharge:  Post-Op Day 3 S/P   Subjective     Patient reports:    Doing well - ready for discharge. Pain well controlled. Tolerating po and   having flatus. Voiding and ambulating without difficulty. Lochia normal.     Vital Signs  Temp:  [98.5 °F (36.9 °C)-98.7 °F (37.1 °C)] 98.5 °F (36.9 °C)  Heart Rate:  [76-80] 76  Resp:  [16] 16  BP: (111-116)/(70-75) 111/70    Physical Exam    Gen Alert and awake   Abdomen Soft, ND,  Fundus firm with minimal tenderness   Incision  Intact, without erythema or exudate   Extremities Calves NT bilaterally     Assessment/Plan ]   Assessment:  POD 3  -  Doing well. Stable for discharge.     Plan:    Instructions reviewed       Consults:   Consults     No orders found from 2019 to 2019.          Discharge Disposition  Home or Self Care    Discharge Medications     Discharge Medications      New Medications      Instructions Start Date   ibuprofen 600 MG tablet  Commonly known as:  ADVIL,MOTRIN   600 mg, Oral, Every 6 Hours PRN      oxyCODONE-acetaminophen 5-325 MG per tablet  Commonly known as:  PERCOCET   2 tablets, Oral, Every 4 Hours PRN         Continue These Medications      Instructions Start Date   prenatal vitamin 27-0.8 27-0.8 MG tablet tablet   Oral, Daily         Stop These Medications    acetaminophen 500 MG tablet  Commonly known as:  TYLENOL     aspirin 81 MG chewable tablet     NIFEdipine 20 MG capsule  Commonly known as:  PROCARDIA     progesterone 200 MG capsule  Commonly known as:  PROMETRIUM            The patient has been prescribed a  controlled substance.  She has been counseled on the risks associated with using the medication.   The addictive potential of this medication and alternatives were discussed carefully with this patient and she demonstrated understanding.  A [facility]I report has been obtained and reviewed.    Activity at Discharge:     Follow-up Appointments  No future appointments.      Test Results Pending at Discharge       STEPH Mccord  05/11/19  9:10 AM

## 2019-05-11 NOTE — LACTATION NOTE
Mom reports her right nipple started bleeding last night and her left is sore. She is using APNO and BF with nipple shield on left. Mom knows to hand express or pump right breast if possible. Educated on mastitis, yeast inf. , milk coming in and baby's expected output and weight gain. Gave [facility] card for f/u      Lactation Consult Note    Evaluation Completed: 2019 8:46 AM  Patient Name: Jer Belcher  :  1990  MRN:  [id number]     REFERRAL  INFORMATION:                                         DELIVERY HISTORY:          Skin to skin initiation date/time: 2019  11:07 PM   Skin to skin end date/time:              MATERNAL ASSESSMENT:                               INFANT ASSESSMENT:  Information for the patient's :  Ye OgomaToya [[id number]]   No past medical history on file.                                                                                                                                MATERNAL INFANT FEEDING:                                                                       EQUIPMENT TYPE:                                 BREAST PUMPING:          LACTATION REFERRALS:

## 2019-05-11 NOTE — PLAN OF CARE
Problem: Patient Care Overview  Goal: Plan of Care Review  Outcome: Ongoing (interventions implemented as appropriate)   05/10/19 2214   Coping/Psychosocial   Plan of Care Reviewed With patient   Plan of Care Review   Progress improving   OTHER   Outcome Summary Doing well, pain well controlled w po meds, working on bst feeding nipples damaged-baby not opening wide and has a small mouth-mother can position and fix latch well-APNO ordered and nipple shield given        Problem:  Delivery (Adult,Obstetrics,Pediatric)  Goal: Signs and Symptoms of Listed Potential Problems Will be Absent, Minimized or Managed ( Delivery)  Outcome: Ongoing (interventions implemented as appropriate)   05/10/19 2214   Goal/Outcome Evaluation   Problems Assessed ( Delivery) all   Problems Present ( Delivery) none

## 2019-05-13 ENCOUNTER — TELEPHONE (OUTPATIENT)
Dept: LACTATION | Facility: HOSPITAL | Age: 29
End: 2019-05-13

## 2019-05-13 NOTE — TELEPHONE ENCOUNTER
Pt states her milk came in last night and is engorged.  Discussed engorgement management.  Jer will call back tomorrow if not seeing improvement.

## 2019-10-14 ENCOUNTER — OFFICE VISIT (OUTPATIENT)
Dept: FAMILY MEDICINE CLINIC | Facility: CLINIC | Age: 29
End: 2019-10-14

## 2019-10-14 VITALS
HEIGHT: 65 IN | RESPIRATION RATE: 16 BRPM | DIASTOLIC BLOOD PRESSURE: 78 MMHG | HEART RATE: 67 BPM | SYSTOLIC BLOOD PRESSURE: 110 MMHG | WEIGHT: 164 LBS | TEMPERATURE: 98 F | OXYGEN SATURATION: 99 % | BODY MASS INDEX: 27.32 KG/M2

## 2019-10-14 DIAGNOSIS — R01.1 HEART MURMUR: ICD-10-CM

## 2019-10-14 DIAGNOSIS — F41.8 SITUATIONAL ANXIETY: Primary | ICD-10-CM

## 2019-10-14 PROCEDURE — 99203 OFFICE O/P NEW LOW 30 MIN: CPT | Performed by: PHYSICIAN ASSISTANT

## 2019-10-14 RX ORDER — BUSPIRONE HYDROCHLORIDE 10 MG/1
10 TABLET ORAL 2 TIMES DAILY
Qty: 60 TABLET | Refills: 2 | Status: SHIPPED | OUTPATIENT
Start: 2019-10-14 | End: 2019-11-13 | Stop reason: SDUPTHER

## 2019-10-14 NOTE — PROGRESS NOTES
"Subjective   Jer Belcher is a 29 y.o. female.     History of Present Illness   Jer Belcher 29 y.o. female who presents today for a new patient appointment.    she has a history of   Patient Active Problem List   Diagnosis   (none) - all problems resolved or deleted   .  she is here to establish care I reviewed the PFSH recorded today by my MA/LPN staff.   she   She has been feeling anxious. Do consider daily Rx like Lexapro if need.  Had baby 5mos ago; runs 3-4 times a week  I see her Dad and is on Lexapro--[place];  Spouse is on Buspar  She wants to take PRN to start  Jer Belcher female 29 y.o., /78 (BP Location: Left arm, Patient Position: Sitting, Cuff Size: Adult)   Pulse 67   Temp 98 °F (36.7 °C) (Oral)   Resp 16   Ht 165.1 cm (65\")   Wt 74.4 kg (164 lb)   LMP 10/07/2019   SpO2 99%   Breastfeeding? No   BMI 27.29 kg/m²   who presents today for new to me eval of Anxiety.  She reports more anxiety to go to sleep.  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 Has more situational anxiety; was on daily Rx and felt tired and gained weight.  Will plan Buspar for now.  Onset of symptoms was approximately several years ago.  She denies current suicidal and homicidal ideation. Risk factors are family history of anxiety and or depression and lifestyle of multiple roles.  Previous treatment includes was on SSRI.  She complains of the following medication side effects: none.  The patient has previously been in counseling..    Labs in 7 mos or so for prevention    The following portions of the patient's history were reviewed and updated as appropriate: allergies, current medications, past family history, past medical history, past social history, past surgical history and problem list.    Review of Systems   Constitutional: Negative for activity change, appetite change and unexpected weight change.   HENT: Negative for nosebleeds and trouble swallowing.    Eyes: Negative for pain and visual disturbance. "   Respiratory: Negative for chest tightness, shortness of breath and wheezing.    Cardiovascular: Negative for chest pain and palpitations.   Gastrointestinal: Negative for abdominal pain and blood in stool.   Endocrine: Negative.    Genitourinary: Negative for difficulty urinating and hematuria.   Musculoskeletal: Negative for joint swelling.   Skin: Negative for color change and rash.   Allergic/Immunologic: Negative.    Neurological: Negative for syncope and speech difficulty.   Hematological: Negative for adenopathy.   Psychiatric/Behavioral: Negative for agitation and confusion. The patient is nervous/anxious.    All other systems reviewed and are negative.      Objective   Physical Exam   Constitutional: She is oriented to person, place, and time. She appears well-developed and well-nourished. No distress.   HENT:   Head: Normocephalic and atraumatic.   Right Ear: External ear normal.   Left Ear: External ear normal.   Nose: Nose normal.   Mouth/Throat: Oropharynx is clear and moist. No oropharyngeal exudate.   Eyes: Conjunctivae and EOM are normal. Pupils are equal, round, and reactive to light. No scleral icterus.   Neck: Normal range of motion. Neck supple. No thyromegaly present.   Cardiovascular: Normal rate, regular rhythm and intact distal pulses.   Murmur heard.  LSB 1/6   Pulmonary/Chest: Effort normal and breath sounds normal. No respiratory distress. She has no wheezes. She has no rales.   Abdominal: Soft. Bowel sounds are normal. There is no tenderness.   Musculoskeletal: Normal range of motion. She exhibits no deformity.   Lymphadenopathy:     She has no cervical adenopathy.   Neurological: She is alert and oriented to person, place, and time. Coordination normal.   Skin: Skin is warm and dry.   Psychiatric: She has a normal mood and affect. Her behavior is normal. Judgment and thought content normal.   Nursing note and vitals reviewed.      Assessment/Plan   Problems Addressed this Visit     None       Visit Diagnoses     Situational anxiety    -  Primary    Heart murmur        Relevant Orders    Adult Transthoracic Echo Complete W/ Cont if Necessary Per Protocol          Try Buspar PRN anxiety  Getting echo d/t faint murmur

## 2019-11-11 ENCOUNTER — HOSPITAL ENCOUNTER (OUTPATIENT)
Dept: CARDIOLOGY | Facility: HOSPITAL | Age: 29
Discharge: HOME | End: 2019-11-11
Admitting: PHYSICIAN ASSISTANT

## 2019-11-11 VITALS
BODY MASS INDEX: 27.32 KG/M2 | HEART RATE: 72 BPM | WEIGHT: 164 LBS | DIASTOLIC BLOOD PRESSURE: 70 MMHG | HEIGHT: 65 IN | SYSTOLIC BLOOD PRESSURE: 118 MMHG

## 2019-11-11 DIAGNOSIS — R01.1 HEART MURMUR: ICD-10-CM

## 2019-11-11 PROBLEM — I34.0 MILD MITRAL REGURGITATION BY PRIOR ECHOCARDIOGRAM: Status: ACTIVE | Noted: 2019-11-11

## 2019-11-11 LAB
AORTIC ARCH: 1.5 CM
AORTIC ROOT ANNULUS: 2.2 CM
ASCENDING AORTA: 2.2 CM
BH CV ECHO MEAS - ACS: 1.9 CM
BH CV ECHO MEAS - AO MAX PG (FULL): 5.1 MMHG
BH CV ECHO MEAS - AO MAX PG: 9.5 MMHG
BH CV ECHO MEAS - AO MEAN PG (FULL): 2.6 MMHG
BH CV ECHO MEAS - AO MEAN PG: 5.2 MMHG
BH CV ECHO MEAS - AO V2 MAX: 154.2 CM/SEC
BH CV ECHO MEAS - AO V2 MEAN: 108.2 CM/SEC
BH CV ECHO MEAS - AO V2 VTI: 32.3 CM
BH CV ECHO MEAS - ASC AORTA: 2.2 CM
BH CV ECHO MEAS - AVA(I,A): 2.1 CM^2
BH CV ECHO MEAS - AVA(I,D): 2.1 CM^2
BH CV ECHO MEAS - AVA(V,A): 2 CM^2
BH CV ECHO MEAS - AVA(V,D): 2 CM^2
BH CV ECHO MEAS - BSA(HAYCOCK): 1.9 M^2
BH CV ECHO MEAS - BSA: 1.8 M^2
BH CV ECHO MEAS - BZI_BMI: 27.3 KILOGRAMS/M^2
BH CV ECHO MEAS - BZI_METRIC_HEIGHT: 165.1 CM
BH CV ECHO MEAS - BZI_METRIC_WEIGHT: 74.4 KG
BH CV ECHO MEAS - EDV(CUBED): 112.3 ML
BH CV ECHO MEAS - EDV(TEICH): 108.8 ML
BH CV ECHO MEAS - EF(CUBED): 66.5 %
BH CV ECHO MEAS - EF(MOD-BP): 58 %
BH CV ECHO MEAS - EF(TEICH): 58 %
BH CV ECHO MEAS - ESV(CUBED): 37.6 ML
BH CV ECHO MEAS - ESV(TEICH): 45.7 ML
BH CV ECHO MEAS - FS: 30.6 %
BH CV ECHO MEAS - IVS/LVPW: 1.1
BH CV ECHO MEAS - IVSD: 0.89 CM
BH CV ECHO MEAS - LAT PEAK E' VEL: 22 CM/SEC
BH CV ECHO MEAS - LV MASS(C)D: 140.5 GRAMS
BH CV ECHO MEAS - LV MASS(C)DI: 77.3 GRAMS/M^2
BH CV ECHO MEAS - LV MAX PG: 4.4 MMHG
BH CV ECHO MEAS - LV MEAN PG: 2.6 MMHG
BH CV ECHO MEAS - LV V1 MAX: 105 CM/SEC
BH CV ECHO MEAS - LV V1 MEAN: 76.9 CM/SEC
BH CV ECHO MEAS - LV V1 VTI: 23.3 CM
BH CV ECHO MEAS - LVIDD: 4.8 CM
BH CV ECHO MEAS - LVIDS: 3.3 CM
BH CV ECHO MEAS - LVOT AREA (M): 2.8 CM^2
BH CV ECHO MEAS - LVOT AREA: 2.9 CM^2
BH CV ECHO MEAS - LVOT DIAM: 1.9 CM
BH CV ECHO MEAS - LVPWD: 0.83 CM
BH CV ECHO MEAS - MED PEAK E' VEL: 13 CM/SEC
BH CV ECHO MEAS - MR MAX PG: 23.3 MMHG
BH CV ECHO MEAS - MR MAX VEL: 241.4 CM/SEC
BH CV ECHO MEAS - MV A DUR: 0.11 SEC
BH CV ECHO MEAS - MV A MAX VEL: 64.3 CM/SEC
BH CV ECHO MEAS - MV DEC SLOPE: 441 CM/SEC^2
BH CV ECHO MEAS - MV DEC TIME: 0.19 SEC
BH CV ECHO MEAS - MV E MAX VEL: 83.6 CM/SEC
BH CV ECHO MEAS - MV E/A: 1.3
BH CV ECHO MEAS - MV MAX PG: 5.6 MMHG
BH CV ECHO MEAS - MV MEAN PG: 1.8 MMHG
BH CV ECHO MEAS - MV P1/2T MAX VEL: 84 CM/SEC
BH CV ECHO MEAS - MV P1/2T: 55.8 MSEC
BH CV ECHO MEAS - MV V2 MAX: 118.3 CM/SEC
BH CV ECHO MEAS - MV V2 MEAN: 60.4 CM/SEC
BH CV ECHO MEAS - MV V2 VTI: 31 CM
BH CV ECHO MEAS - MVA P1/2T LCG: 2.6 CM^2
BH CV ECHO MEAS - MVA(P1/2T): 3.9 CM^2
BH CV ECHO MEAS - MVA(VTI): 2.2 CM^2
BH CV ECHO MEAS - PA ACC TIME: 0.14 SEC
BH CV ECHO MEAS - PA MAX PG (FULL): 0.86 MMHG
BH CV ECHO MEAS - PA MAX PG: 4.1 MMHG
BH CV ECHO MEAS - PA PR(ACCEL): 14.8 MMHG
BH CV ECHO MEAS - PA V2 MAX: 100.8 CM/SEC
BH CV ECHO MEAS - PULM A REVS DUR: 0.12 SEC
BH CV ECHO MEAS - PULM A REVS VEL: 27 CM/SEC
BH CV ECHO MEAS - PULM DIAS VEL: 49.3 CM/SEC
BH CV ECHO MEAS - PULM S/D: 1
BH CV ECHO MEAS - PULM SYS VEL: 49.3 CM/SEC
BH CV ECHO MEAS - PVA(V,A): 1.6 CM^2
BH CV ECHO MEAS - PVA(V,D): 1.6 CM^2
BH CV ECHO MEAS - QP/QS: 0.52
BH CV ECHO MEAS - RAP SYSTOLE: 8 MMHG
BH CV ECHO MEAS - RV MAX PG: 3.2 MMHG
BH CV ECHO MEAS - RV MEAN PG: 1.9 MMHG
BH CV ECHO MEAS - RV V1 MAX: 89.5 CM/SEC
BH CV ECHO MEAS - RV V1 MEAN: 65.1 CM/SEC
BH CV ECHO MEAS - RV V1 VTI: 19.8 CM
BH CV ECHO MEAS - RVOT AREA: 1.8 CM^2
BH CV ECHO MEAS - RVOT DIAM: 1.5 CM
BH CV ECHO MEAS - RVSP: 31 MMHG
BH CV ECHO MEAS - SI(CUBED): 41.1 ML/M^2
BH CV ECHO MEAS - SI(LVOT): 37.1 ML/M^2
BH CV ECHO MEAS - SI(TEICH): 34.7 ML/M^2
BH CV ECHO MEAS - SUP REN AO DIAM: 1.6 CM
BH CV ECHO MEAS - SV(CUBED): 74.7 ML
BH CV ECHO MEAS - SV(LVOT): 67.4 ML
BH CV ECHO MEAS - SV(RVOT): 35.4 ML
BH CV ECHO MEAS - SV(TEICH): 63.1 ML
BH CV ECHO MEAS - TAPSE (>1.6): 2.2 CM2
BH CV ECHO MEAS - TR MAX VEL: 238.5 CM/SEC
BH CV ECHO MEASUREMENTS AVERAGE E/E' RATIO: 4.78
BH CV XLRA - RV BASE: 3.1 CM
BH CV XLRA - RV LENGTH: 8.5 CM
BH CV XLRA - RV MID: 1.9 CM
BH CV XLRA - TDI S': 17 CM/SEC
LEFT ATRIUM VOLUME INDEX: 28 ML/M2
MAXIMAL PREDICTED HEART RATE: 189 BPM
SINUS: 2.4 CM
STJ: 2 CM
STRESS TARGET HR: 161 BPM

## 2019-11-11 PROCEDURE — 93306 TTE W/DOPPLER COMPLETE: CPT

## 2019-11-11 PROCEDURE — 93306 TTE W/DOPPLER COMPLETE: CPT | Performed by: INTERNAL MEDICINE

## 2019-11-13 ENCOUNTER — OFFICE VISIT (OUTPATIENT)
Dept: FAMILY MEDICINE CLINIC | Facility: CLINIC | Age: 29
End: 2019-11-13

## 2019-11-13 VITALS
TEMPERATURE: 97.8 F | OXYGEN SATURATION: 100 % | HEART RATE: 67 BPM | RESPIRATION RATE: 16 BRPM | DIASTOLIC BLOOD PRESSURE: 68 MMHG | WEIGHT: 165 LBS | BODY MASS INDEX: 27.49 KG/M2 | SYSTOLIC BLOOD PRESSURE: 110 MMHG | HEIGHT: 65 IN

## 2019-11-13 DIAGNOSIS — J06.9 ACUTE URI: Primary | ICD-10-CM

## 2019-11-13 DIAGNOSIS — J20.9 ACUTE BRONCHITIS DUE TO INFECTION: ICD-10-CM

## 2019-11-13 DIAGNOSIS — F41.8 SITUATIONAL ANXIETY: ICD-10-CM

## 2019-11-13 PROCEDURE — 99214 OFFICE O/P EST MOD 30 MIN: CPT | Performed by: PHYSICIAN ASSISTANT

## 2019-11-13 RX ORDER — BUSPIRONE HYDROCHLORIDE 10 MG/1
10 TABLET ORAL 3 TIMES DAILY
Qty: 270 TABLET | Refills: 1 | Status: SHIPPED | OUTPATIENT
Start: 2019-11-13 | End: 2020-11-05

## 2019-11-13 RX ORDER — CEFDINIR 300 MG/1
CAPSULE ORAL
Qty: 20 CAPSULE | Refills: 0 | Status: SHIPPED | OUTPATIENT
Start: 2019-11-13 | End: 2020-11-05

## 2019-11-13 RX ORDER — PREDNISONE 10 MG/1
10 TABLET ORAL
Qty: 15 TABLET | Refills: 0 | Status: SHIPPED | OUTPATIENT
Start: 2019-11-13 | End: 2020-11-05

## 2019-11-13 NOTE — PATIENT INSTRUCTIONS

## 2019-11-13 NOTE — PROGRESS NOTES
"Subjective   Jer Belcher is a 29 y.o. female.     History of Present Illness   Jer Belcher female 29 y.o., /68 (BP Location: Left arm, Patient Position: Sitting, Cuff Size: Large Adult)   Pulse 67   Temp 97.8 °F (36.6 °C) (Oral)   Resp 16   Ht 165.1 cm (65\")   Wt 74.8 kg (165 lb)   LMP 10/23/2019   SpO2 100%   BMI 27.46 kg/m²   who presents today for follow up of Anxiety.  She reports medication is helping and taking routine for now; it really helps her anxiety. Onset of symptoms was approximately several months ago.  She denies current suicidal and homicidal ideation. Risk factors are family history of anxiety and or depression and lifestyle of multiple roles.  Previous treatment includes prior SSRI.  She complains of the following medication side effects: none.  The patient declines to go to counseling..  Refill Buspar and cont.    Jer Belcher 29 y.o. female who presents for evaluation of upper respiratory congestion, cough, fatigue. Symptoms include congestion, sore throat, post nasal drip, cough described as productive of green sputum and runny nose.  Starting to get sinus pain; Start the antibiotic if you get more congested, fever, or color to drainage.  If this does not happen, but still sick after 7 days time, then start it.  Onset of symptoms was 3 days ago, gradually worsening since that time. Patient denies shortness of breath, wheezing, but chest tightness.   Evaluation to date: none Treatment to date:  none.       The following portions of the patient's history were reviewed and updated as appropriate: allergies, current medications, past family history, past medical history, past social history, past surgical history and problem list.    Review of Systems   Constitutional: Positive for fatigue. Negative for activity change and unexpected weight change.   HENT: Positive for congestion, postnasal drip, sinus pressure and sore throat. Negative for ear pain.    Eyes: Positive for " itching. Negative for pain and discharge.   Respiratory: Positive for cough and chest tightness. Negative for shortness of breath and wheezing.    Cardiovascular: Negative for chest pain and palpitations.   Gastrointestinal: Negative for abdominal pain, diarrhea and vomiting.   Endocrine: Negative.    Genitourinary: Negative.    Musculoskeletal: Negative for joint swelling.   Skin: Negative for color change, rash and wound.   Allergic/Immunologic: Negative.    Neurological: Negative for seizures and syncope.   Psychiatric/Behavioral: Negative.        Objective   Physical Exam   Constitutional: She is oriented to person, place, and time. She appears well-developed and well-nourished.  Non-toxic appearance. No distress.   HENT:   Head: Normocephalic and atraumatic. Hair is normal.   Right Ear: External ear normal. No drainage, swelling or tenderness. Tympanic membrane is retracted.   Left Ear: External ear normal. No drainage, swelling or tenderness. Tympanic membrane is retracted.   Nose: Mucosal edema present. No epistaxis. Right sinus exhibits maxillary sinus tenderness. Left sinus exhibits maxillary sinus tenderness.   Mouth/Throat: Uvula is midline and mucous membranes are normal. No oral lesions. No uvula swelling. Posterior oropharyngeal erythema present. No oropharyngeal exudate.   Eyes: Conjunctivae and EOM are normal. Pupils are equal, round, and reactive to light. Right eye exhibits no discharge. Left eye exhibits no discharge. No scleral icterus.   Neck: Normal range of motion. Neck supple.   Cardiovascular: Normal rate, regular rhythm and normal heart sounds. Exam reveals no gallop.   No murmur heard.  Pulmonary/Chest: Effort normal and breath sounds normal. No stridor. No respiratory distress. She has no wheezes. She has no rales. She exhibits no tenderness.   Deep bronchial cough   Abdominal: Soft. There is no tenderness.   Lymphadenopathy:     She has cervical adenopathy.   Neurological: She is alert  and oriented to person, place, and time. She exhibits normal muscle tone.   Skin: Skin is warm and dry. No rash noted. She is not diaphoretic.   Psychiatric: She has a normal mood and affect. Her behavior is normal. Judgment and thought content normal.   Nursing note and vitals reviewed.      Assessment/Plan   Jer was seen today for uri.    Diagnoses and all orders for this visit:    Acute URI    Acute bronchitis due to infection    Situational anxiety    Other orders  -     busPIRone (BUSPAR) 10 MG tablet; Take 1 tablet by mouth 3 (Three) Times a Day for 90 days. PRN anxiety  -     cefdinir (OMNICEF) 300 MG capsule; One cap PO BID for infection  -     predniSONE (DELTASONE) 10 MG tablet; Take 1 tablet by mouth 3 (Three) Times a Day With Meals.        Refill Buspar and working well for anxiety  Start the antibiotic if you get more congested, fever, or color to drainage.  If this does not happen, but still sick after 7 days time, then start it.  Start with oral pred today

## 2020-11-05 ENCOUNTER — OFFICE VISIT (OUTPATIENT)
Dept: FAMILY MEDICINE CLINIC | Facility: CLINIC | Age: 30
End: 2020-11-05

## 2020-11-05 VITALS
OXYGEN SATURATION: 98 % | DIASTOLIC BLOOD PRESSURE: 70 MMHG | HEART RATE: 82 BPM | HEIGHT: 65 IN | RESPIRATION RATE: 16 BRPM | WEIGHT: 157.2 LBS | BODY MASS INDEX: 26.19 KG/M2 | SYSTOLIC BLOOD PRESSURE: 90 MMHG | TEMPERATURE: 97.5 F

## 2020-11-05 DIAGNOSIS — F41.1 GENERALIZED ANXIETY DISORDER: Primary | ICD-10-CM

## 2020-11-05 DIAGNOSIS — F34.1 DYSTHYMIA: ICD-10-CM

## 2020-11-05 DIAGNOSIS — M72.2 PLANTAR FASCIITIS: ICD-10-CM

## 2020-11-05 PROCEDURE — 99213 OFFICE O/P EST LOW 20 MIN: CPT | Performed by: PHYSICIAN ASSISTANT

## 2020-11-05 RX ORDER — NORETHINDRONE ACETATE AND ETHINYL ESTRADIOL, AND FERROUS FUMARATE 1MG-20(24)
KIT ORAL
COMMUNITY
Start: 2020-10-23 | End: 2022-03-21 | Stop reason: ALTCHOICE

## 2020-11-05 RX ORDER — BUSPIRONE HYDROCHLORIDE 10 MG/1
10 TABLET ORAL 3 TIMES DAILY
Qty: 270 TABLET | Refills: 3 | Status: SHIPPED | OUTPATIENT
Start: 2020-11-05 | End: 2021-02-03

## 2020-11-05 NOTE — PATIENT INSTRUCTIONS
Sertraline tablets  What is this medicine?  SERTRALINE (SER tra becky) is used to treat depression. It may also be used to treat obsessive compulsive disorder, panic disorder, post-trauma stress, premenstrual dysphoric disorder (PMDD) or social anxiety.  This medicine may be used for other purposes; ask your health care provider or pharmacist if you have questions.  COMMON BRAND NAME(S): Zoloft  What should I tell my health care provider before I take this medicine?  They need to know if you have any of these conditions:  · bleeding disorders  · bipolar disorder or a family history of bipolar disorder  · glaucoma  · heart disease  · high blood pressure  · history of irregular heartbeat  · history of low levels of calcium, magnesium, or potassium in the blood  · if you often drink alcohol  · liver disease  · receiving electroconvulsive therapy  · seizures  · suicidal thoughts, plans, or attempt; a previous suicide attempt by you or a family member  · take medicines that treat or prevent blood clots  · thyroid disease  · an unusual or allergic reaction to sertraline, other medicines, foods, dyes, or preservatives  · pregnant or trying to get pregnant  · breast-feeding  How should I use this medicine?  Take this medicine by mouth with a glass of water. Follow the directions on the prescription label. You can take it with or without food. Take your medicine at regular intervals. Do not take your medicine more often than directed. Do not stop taking this medicine suddenly except upon the advice of your doctor. Stopping this medicine too quickly may cause serious side effects or your condition may worsen.  A special MedGuide will be given to you by the pharmacist with each prescription and refill. Be sure to read this information carefully each time.  Talk to your pediatrician regarding the use of this medicine in children. While this drug may be prescribed for children as young as 7 years for selected conditions,  precautions do apply.  Overdosage: If you think you have taken too much of this medicine contact a poison control center or emergency room at once.  NOTE: This medicine is only for you. Do not share this medicine with others.  What if I miss a dose?  If you miss a dose, take it as soon as you can. If it is almost time for your next dose, take only that dose. Do not take double or extra doses.  What may interact with this medicine?  Do not take this medicine with any of the following medications:  · cisapride  · dronedarone  · linezolid  · MAOIs like Carbex, Eldepryl, Marplan, Nardil, and Parnate  · methylene blue (injected into a vein)  · pimozide  · thioridazine  This medicine may also interact with the following medications:  · alcohol  · amphetamines  · aspirin and aspirin-like medicines  · certain medicines for depression, anxiety, or psychotic disturbances  · certain medicines for fungal infections like ketoconazole, fluconazole, posaconazole, and itraconazole  · certain medicines for irregular heart beat like flecainide, quinidine, propafenone  · certain medicines for migraine headaches like almotriptan, eletriptan, frovatriptan, naratriptan, rizatriptan, sumatriptan, zolmitriptan  · certain medicines for sleep  · certain medicines for seizures like carbamazepine, valproic acid, phenytoin  · certain medicines that treat or prevent blood clots like warfarin, enoxaparin, dalteparin  · cimetidine  · digoxin  · diuretics  · fentanyl  · isoniazid  · lithium  · NSAIDs, medicines for pain and inflammation, like ibuprofen or naproxen  · other medicines that prolong the QT interval (cause an abnormal heart rhythm) like dofetilide  · rasagiline  · safinamide  · supplements like [address]'s wort, kava kava, valerian  · tolbutamide  · tramadol  · tryptophan  This list may not describe all possible interactions. Give your health care provider a list of all the medicines, herbs, non-prescription drugs, or dietary supplements  you use. Also tell them if you smoke, drink alcohol, or use illegal drugs. Some items may interact with your medicine.  What should I watch for while using this medicine?  Tell your doctor if your symptoms do not get better or if they get worse. Visit your doctor or health care professional for regular checks on your progress. Because it may take several weeks to see the full effects of this medicine, it is important to continue your treatment as prescribed by your doctor.  Patients and their families should watch out for new or worsening thoughts of suicide or depression. Also watch out for sudden changes in feelings such as feeling anxious, agitated, panicky, irritable, hostile, aggressive, impulsive, severely restless, overly excited and hyperactive, or not being able to sleep. If this happens, especially at the beginning of treatment or after a change in dose, call your health care professional.  You may get drowsy or dizzy. Do not drive, use machinery, or do anything that needs mental alertness until you know how this medicine affects you. Do not stand or sit up quickly, especially if you are an older patient. This reduces the risk of dizzy or fainting spells. Alcohol may interfere with the effect of this medicine. Avoid alcoholic drinks.  Your mouth may get dry. Chewing sugarless gum or sucking hard candy, and drinking plenty of water may help. Contact your doctor if the problem does not go away or is severe.  What side effects may I notice from receiving this medicine?  Side effects that you should report to your doctor or health care professional as soon as possible:  · allergic reactions like skin rash, itching or hives, swelling of the face, lips, or tongue  · anxious  · black, tarry stools  · changes in vision  · confusion  · elevated mood, decreased need for sleep, racing thoughts, impulsive behavior  · eye pain  · fast, irregular heartbeat  · feeling faint or lightheaded, falls  · feeling agitated,  angry, or irritable  · hallucination, loss of contact with reality  · loss of balance or coordination  · loss of memory  · painful or prolonged erections  · restlessness, pacing, inability to keep still  · seizures  · stiff muscles  · suicidal thoughts or other mood changes  · trouble sleeping  · unusual bleeding or bruising  · unusually weak or tired  · vomiting  Side effects that usually do not require medical attention (report to your doctor or health care professional if they continue or are bothersome):  · change in appetite or weight  · change in sex drive or performance  · diarrhea  · increased sweating  · indigestion, nausea  · tremors  This list may not describe all possible side effects. Call your doctor for medical advice about side effects. You may report side effects to FDA at 1-362-EJF-8302.  Where should I keep my medicine?  Keep out of the reach of children.  Store at room temperature between 15 and 30 degrees C (59 and 86 degrees F). Throw away any unused medicine after the expiration date.  NOTE: This sheet is a summary. It may not cover all possible information. If you have questions about this medicine, talk to your doctor, pharmacist, or health care provider.  © 2020 [employer] (2019-12-10 10:09:27)

## 2020-11-05 NOTE — PROGRESS NOTES
"Subjective   Jer Belcher is a 30 y.o. female.     History of Present Illness   Jer Belcher female 30 y.o., BP 90/70 (BP Location: Left arm, Patient Position: Sitting, Cuff Size: Adult)   Pulse 82   Temp 97.5 °F (36.4 °C)   Resp 16   Ht 165.1 cm (65\")   Wt 71.3 kg (157 lb 3.2 oz)   SpO2 98%   BMI 26.16 kg/m²   who presents today for follow up of Anxiety and low moods---more consistent; having to take Buspar TID daily and still having anxiety. now some low moods.  She reports depressed mood, difficulty falling asleep, fatigue, impaired concentration, excessive worry, unable to relax, irritable and sleep disturbance. Onset of symptoms was approximately several months ago.  She denies current suicidal and homicidal ideation. Risk factors are family history of anxiety and or depression and lifestyle of multiple roles.  Previous treatment includes current Rx.  She complains of the following medication side effects: none.  The patient is going to counseling..      Plantar fascitis bilat and wearing brace and doing the exercises--no help; onset mos ago  She does have mild mitral regurg per echo---10-14-19---repeat 5 years  The following portions of the patient's history were reviewed and updated as appropriate: allergies, current medications, past family history, past medical history, past social history, past surgical history and problem list.    Review of Systems   Constitutional: Negative for activity change, appetite change and unexpected weight change.   HENT: Negative for nosebleeds and trouble swallowing.    Eyes: Negative for pain and visual disturbance.   Respiratory: Negative for chest tightness, shortness of breath and wheezing.    Cardiovascular: Negative for chest pain and palpitations.   Gastrointestinal: Negative for abdominal pain and blood in stool.   Endocrine: Negative.    Genitourinary: Negative for difficulty urinating and hematuria.   Musculoskeletal: Negative for joint swelling.   Skin: " Negative for color change and rash.   Allergic/Immunologic: Negative.    Neurological: Negative for syncope and speech difficulty.   Hematological: Negative for adenopathy.   Psychiatric/Behavioral: Positive for agitation and sleep disturbance. Negative for confusion. The patient is nervous/anxious.    All other systems reviewed and are negative.      Objective   Physical Exam  Vitals signs and nursing note reviewed.   Constitutional:       General: She is not in acute distress.     Appearance: Normal appearance. She is well-developed. She is not ill-appearing, toxic-appearing or diaphoretic.   HENT:      Head: Normocephalic.      Right Ear: External ear normal.      Left Ear: External ear normal.      Nose: Nose normal.      Mouth/Throat:      Pharynx: Oropharynx is clear.   Eyes:      General: No scleral icterus.     Conjunctiva/sclera: Conjunctivae normal.      Pupils: Pupils are equal, round, and reactive to light.   Neck:      Musculoskeletal: Normal range of motion and neck supple.      Thyroid: No thyromegaly.   Cardiovascular:      Rate and Rhythm: Normal rate and regular rhythm.      Heart sounds: Normal heart sounds. No murmur.   Pulmonary:      Effort: Pulmonary effort is normal. No respiratory distress.      Breath sounds: Normal breath sounds.   Musculoskeletal: Normal range of motion.         General: No deformity.      Right lower leg: No edema.      Left lower leg: No edema.   Skin:     General: Skin is warm and dry.      Coloration: Skin is not jaundiced.   Neurological:      General: No focal deficit present.      Mental Status: She is alert and oriented to person, place, and time. Mental status is at baseline.   Psychiatric:         Behavior: Behavior normal.         Thought Content: Thought content normal.         Judgment: Judgment normal.         Assessment/Plan   Diagnoses and all orders for this visit:    1. Generalized anxiety disorder (Primary)    2. Dysthymia    3. Plantar fasciitis  -      Ambulatory Referral to Podiatry    Other orders  -     busPIRone (BUSPAR) 10 MG tablet; Take 1 tablet by mouth 3 (Three) Times a Day for 90 days. PRN anxiety  Dispense: 270 tablet; Refill: 3  -     sertraline (Zoloft) 50 MG tablet; 1/2 tab PO daily for 4 days then one PO daily for stress  Dispense: 90 tablet; Refill: 0    add Zoloft for anxiety and dysthymia  Keep Buspar same dose for now  F/u 3 weeks

## 2021-12-17 ENCOUNTER — IMMUNIZATION (OUTPATIENT)
Dept: VACCINE CLINIC | Facility: HOSPITAL | Age: 31
End: 2021-12-17

## 2021-12-17 PROCEDURE — 91300 HC SARSCOV02 VAC 30MCG/0.3ML IM: CPT | Performed by: INTERNAL MEDICINE

## 2021-12-17 PROCEDURE — 0004A HC ADM SARSCOV2 30MCG/0.3ML BOOSTER: CPT | Performed by: INTERNAL MEDICINE

## 2022-03-21 ENCOUNTER — OFFICE VISIT (OUTPATIENT)
Dept: CARDIOLOGY | Facility: CLINIC | Age: 32
End: 2022-03-21

## 2022-03-21 VITALS
DIASTOLIC BLOOD PRESSURE: 80 MMHG | BODY MASS INDEX: 26.36 KG/M2 | SYSTOLIC BLOOD PRESSURE: 115 MMHG | HEIGHT: 65 IN | HEART RATE: 69 BPM | WEIGHT: 158.2 LBS

## 2022-03-21 DIAGNOSIS — R00.2 PALPITATIONS: Primary | ICD-10-CM

## 2022-03-21 PROCEDURE — 99204 OFFICE O/P NEW MOD 45 MIN: CPT | Performed by: INTERNAL MEDICINE

## 2022-03-21 NOTE — PROGRESS NOTES
"      CARDIOLOGY    Pascual Manzo MD    ENCOUNTER DATE:  03/21/2022    Jer Belcher / 31 y.o. / female        CHIEF COMPLAINT / REASON FOR OFFICE VISIT   Family history of hypertrophic obstructive cardiomyopathy    HISTORY OF PRESENT ILLNESS       HPI  Jer Belcher is a 31 y.o. female who presents today for consultation.  Patient been told in the past has had mild mitral regurgitation.  Her mother was recently diagnosed with hypertrophic obstructive cardiomyopathy.  With this she is being evaluated for further evaluation from a cardiovascular standpoint.  Patient does say she gets intermittent episodes of palpitations.  She also does get occasional lightheadedness.  Her heart rate does  to about the 110 range.  She has 2 small children at the age of 2 and 5 at home and says she has been under a lot of stress recently especially with her mother undergoing surgery.      The following portions of the patient's history were reviewed and updated as appropriate: allergies, current medications, past family history, past medical history, past social history, past surgical history and problem list.      VITAL SIGNS     Visit Vitals  /80 (BP Location: Left arm)   Pulse 69   Ht 165.1 cm (65\")   Wt 71.8 kg (158 lb 3.2 oz)   BMI 26.33 kg/m²         Wt Readings from Last 3 Encounters:   03/21/22 71.8 kg (158 lb 3.2 oz)   11/05/20 71.3 kg (157 lb 3.2 oz)   11/13/19 74.8 kg (165 lb)     Body mass index is 26.33 kg/m².      REVIEW OF SYSTEMS   ROS        PHYSICAL EXAMINATION     Vitals reviewed.   Cardiovascular:      Normal rate. Regular rhythm. Normal S1. Normal S2.      Murmurs: There is no murmur.      No gallop. No click. No rub.   Pulses:     Intact distal pulses.   Edema:     Peripheral edema absent.           REVIEWED DATA     Procedures    Cardiac Procedures:  1.           ASSESSMENT & PLAN      Diagnosis Plan   1. Palpitations  Adult Transthoracic Echo Complete W/ Cont if Necessary Per Protocol "         SUMMARY/DISCUSSION  1. Palpitations with a family history of a hypertrophic obstructive cardiomyopathy.  With this in mind I am actually going to set her up for an echocardiogram to rule out structural heart disease.  If her echo is unremarkable I think I would just follow her clinically and see what evolves.  Her heart rate is not going up in the 150 range or higher.  Because of that I would follow her clinically and see what evolves.  I did see if she can get a rhythm strip with her current device that she was wearing.  It was not an apple watch and not sure she has that capability but if her palpitations recur it would be helpful.        MEDICATIONS         Discharge Medications          Accurate as of March 21, 2022  3:38 PM. If you have any questions, ask your nurse or doctor.            Continue These Medications      Instructions Start Date   busPIRone 10 MG tablet  Commonly known as: BUSPAR   10 mg, Oral, 3 Times Daily, PRN anxiety                 **Dragon Disclaimer:   Much of this encounter note is an electronic transcription/translation of spoken language to printed text. The electronic translation of spoken language may permit erroneous, or at times, nonsensical words or phrases to be inadvertently transcribed. Although I have reviewed the note for such errors, some may still exist.

## 2022-03-31 ENCOUNTER — HOSPITAL ENCOUNTER (OUTPATIENT)
Dept: CARDIOLOGY | Facility: HOSPITAL | Age: 32
Discharge: HOME | End: 2022-03-31
Admitting: INTERNAL MEDICINE

## 2022-03-31 VITALS
WEIGHT: 158 LBS | HEART RATE: 74 BPM | DIASTOLIC BLOOD PRESSURE: 60 MMHG | BODY MASS INDEX: 26.33 KG/M2 | HEIGHT: 65 IN | SYSTOLIC BLOOD PRESSURE: 110 MMHG

## 2022-03-31 DIAGNOSIS — R00.2 PALPITATIONS: ICD-10-CM

## 2022-03-31 LAB
AORTIC ARCH: 2.3 CM
ASCENDING AORTA: 2.3 CM
BH CV ECHO MEAS - ACS: 1.82 CM
BH CV ECHO MEAS - AO MAX PG: 11.3 MMHG
BH CV ECHO MEAS - AO MEAN PG: 5.9 MMHG
BH CV ECHO MEAS - AO ROOT DIAM: 2.6 CM
BH CV ECHO MEAS - AO V2 MAX: 168.3 CM/SEC
BH CV ECHO MEAS - AO V2 VTI: 35.8 CM
BH CV ECHO MEAS - AVA(I,D): 1.28 CM2
BH CV ECHO MEAS - EDV(CUBED): 91.7 ML
BH CV ECHO MEAS - EDV(MOD-SP2): 91 ML
BH CV ECHO MEAS - EDV(MOD-SP4): 95 ML
BH CV ECHO MEAS - EF(MOD-BP): 65 %
BH CV ECHO MEAS - EF(MOD-SP2): 64.8 %
BH CV ECHO MEAS - EF(MOD-SP4): 65.3 %
BH CV ECHO MEAS - ESV(CUBED): 21.6 ML
BH CV ECHO MEAS - ESV(MOD-SP2): 32 ML
BH CV ECHO MEAS - ESV(MOD-SP4): 33 ML
BH CV ECHO MEAS - FS: 38.2 %
BH CV ECHO MEAS - IVS/LVPW: 1.04 CM
BH CV ECHO MEAS - IVSD: 0.96 CM
BH CV ECHO MEAS - LAT PEAK E' VEL: 20.1 CM/SEC
BH CV ECHO MEAS - LV DIASTOLIC VOL/BSA (35-75): 53.1 CM2
BH CV ECHO MEAS - LV MASS(C)D: 142.7 GRAMS
BH CV ECHO MEAS - LV MAX PG: 4.3 MMHG
BH CV ECHO MEAS - LV MEAN PG: 2.27 MMHG
BH CV ECHO MEAS - LV SYSTOLIC VOL/BSA (12-30): 18.4 CM2
BH CV ECHO MEAS - LV V1 MAX: 103.5 CM/SEC
BH CV ECHO MEAS - LV V1 VTI: 22.6 CM
BH CV ECHO MEAS - LVIDD: 4.5 CM
BH CV ECHO MEAS - LVIDS: 2.8 CM
BH CV ECHO MEAS - LVOT AREA: 2.03 CM2
BH CV ECHO MEAS - LVOT DIAM: 1.61 CM
BH CV ECHO MEAS - LVPWD: 0.93 CM
BH CV ECHO MEAS - MED PEAK E' VEL: 12.1 CM/SEC
BH CV ECHO MEAS - MR MAX PG: 96 MMHG
BH CV ECHO MEAS - MR MAX VEL: 490 CM/SEC
BH CV ECHO MEAS - MV A DUR: 0.09 SEC
BH CV ECHO MEAS - MV A MAX VEL: 57.5 CM/SEC
BH CV ECHO MEAS - MV DEC SLOPE: 360.3 CM/SEC2
BH CV ECHO MEAS - MV DEC TIME: 0.2 MSEC
BH CV ECHO MEAS - MV E MAX VEL: 85.2 CM/SEC
BH CV ECHO MEAS - MV E/A: 1.48
BH CV ECHO MEAS - MV MAX PG: 4 MMHG
BH CV ECHO MEAS - MV MEAN PG: 2.17 MMHG
BH CV ECHO MEAS - MV V2 VTI: 26.3 CM
BH CV ECHO MEAS - MVA(VTI): 1.74 CM2
BH CV ECHO MEAS - PA ACC TIME: 0.15 SEC
BH CV ECHO MEAS - PA PR(ACCEL): 12.5 MMHG
BH CV ECHO MEAS - PA V2 MAX: 107.2 CM/SEC
BH CV ECHO MEAS - PULM A REVS DUR: 0.12 SEC
BH CV ECHO MEAS - PULM A REVS VEL: 31.6 CM/SEC
BH CV ECHO MEAS - PULM DIAS VEL: 66.9 CM/SEC
BH CV ECHO MEAS - PULM SYS VEL: 53 CM/SEC
BH CV ECHO MEAS - RAP SYSTOLE: 3 MMHG
BH CV ECHO MEAS - RV MAX PG: 3.4 MMHG
BH CV ECHO MEAS - RV V1 MAX: 92 CM/SEC
BH CV ECHO MEAS - RV V1 VTI: 21 CM
BH CV ECHO MEAS - RVOT DIAM: 1.85 CM
BH CV ECHO MEAS - RVSP: 31 MMHG
BH CV ECHO MEAS - SI(MOD-SP2): 33 ML/M2
BH CV ECHO MEAS - SI(MOD-SP4): 34.6 ML/M2
BH CV ECHO MEAS - SUP REN AO DIAM: 1.9 CM
BH CV ECHO MEAS - SV(LVOT): 45.8 ML
BH CV ECHO MEAS - SV(MOD-SP2): 59 ML
BH CV ECHO MEAS - SV(MOD-SP4): 62 ML
BH CV ECHO MEAS - SV(RVOT): 56.3 ML
BH CV ECHO MEAS - TAPSE (>1.6): 2.24 CM
BH CV ECHO MEAS - TR MAX PG: 28 MMHG
BH CV ECHO MEAS - TR MAX VEL: 264.7 CM/SEC
BH CV ECHO MEASUREMENTS AVERAGE E/E' RATIO: 5.29
BH CV XLRA - RV BASE: 2.9 CM
BH CV XLRA - RV LENGTH: 8.1 CM
BH CV XLRA - RV MID: 2.8 CM
BH CV XLRA - TDI S': 15.3 CM/SEC
LEFT ATRIUM VOLUME INDEX: 25.8 ML/M2
MAXIMAL PREDICTED HEART RATE: 187 BPM
SINUS: 2.3 CM
STJ: 2.1 CM
STRESS TARGET HR: 159 BPM

## 2022-03-31 PROCEDURE — 93306 TTE W/DOPPLER COMPLETE: CPT | Performed by: INTERNAL MEDICINE

## 2022-03-31 PROCEDURE — 93306 TTE W/DOPPLER COMPLETE: CPT

## 2025-03-27 ENCOUNTER — OFFICE VISIT (OUTPATIENT)
Dept: INTERNAL MEDICINE | Facility: CLINIC | Age: 35
End: 2025-03-27
Payer: COMMERCIAL

## 2025-03-27 VITALS
HEART RATE: 68 BPM | BODY MASS INDEX: 27.56 KG/M2 | TEMPERATURE: 98 F | HEIGHT: 65 IN | WEIGHT: 165.4 LBS | SYSTOLIC BLOOD PRESSURE: 120 MMHG | DIASTOLIC BLOOD PRESSURE: 82 MMHG | OXYGEN SATURATION: 99 %

## 2025-03-27 DIAGNOSIS — Z76.89 ENCOUNTER TO ESTABLISH CARE: Primary | ICD-10-CM

## 2025-03-27 DIAGNOSIS — Z13.1 SCREENING FOR DIABETES MELLITUS: ICD-10-CM

## 2025-03-27 DIAGNOSIS — Z13.6 SCREENING FOR ISCHEMIC HEART DISEASE (IHD): ICD-10-CM

## 2025-03-27 DIAGNOSIS — Z13.6 HYPERTENSION SCREEN: ICD-10-CM

## 2025-03-27 DIAGNOSIS — F41.8 SITUATIONAL ANXIETY: ICD-10-CM

## 2025-03-27 DIAGNOSIS — Z91.89 AT RISK FOR CORONARY ARTERY DISEASE: ICD-10-CM

## 2025-03-27 DIAGNOSIS — Z00.00 ROUTINE GENERAL MEDICAL EXAMINATION AT A HEALTH CARE FACILITY: ICD-10-CM

## 2025-03-27 DIAGNOSIS — F43.12 CHRONIC POST-TRAUMATIC STRESS DISORDER (PTSD): Chronic | ICD-10-CM

## 2025-03-27 DIAGNOSIS — Z13.6 SCREENING FOR CARDIOVASCULAR CONDITION: ICD-10-CM

## 2025-03-27 DIAGNOSIS — F41.9 ANXIETY: Chronic | ICD-10-CM

## 2025-03-27 DIAGNOSIS — Z28.21 COVID-19 VACCINATION REFUSED: ICD-10-CM

## 2025-03-27 DIAGNOSIS — R74.8 ELEVATED LIVER ENZYMES: ICD-10-CM

## 2025-03-27 LAB
ALBUMIN SERPL-MCNC: 4.3 G/DL (ref 3.5–5.2)
ALBUMIN/GLOB SERPL: 1.7 G/DL
ALP SERPL-CCNC: 54 U/L (ref 39–117)
ALT SERPL-CCNC: 22 U/L (ref 1–33)
AST SERPL-CCNC: 24 U/L (ref 1–32)
BASOPHILS # BLD AUTO: 0.04 10*3/MM3 (ref 0–0.2)
BASOPHILS NFR BLD AUTO: 0.8 % (ref 0–1.5)
BILIRUB SERPL-MCNC: 0.5 MG/DL (ref 0–1.2)
BUN SERPL-MCNC: 9 MG/DL (ref 6–20)
BUN/CREAT SERPL: 13.8 (ref 7–25)
CALCIUM SERPL-MCNC: 9.2 MG/DL (ref 8.6–10.5)
CHLORIDE SERPL-SCNC: 108 MMOL/L (ref 98–107)
CHOLEST SERPL-MCNC: 144 MG/DL (ref 0–200)
CO2 SERPL-SCNC: 26.7 MMOL/L (ref 22–29)
CREAT SERPL-MCNC: 0.65 MG/DL (ref 0.57–1)
EGFRCR SERPLBLD CKD-EPI 2021: 117.2 ML/MIN/1.73
EOSINOPHIL # BLD AUTO: 0.08 10*3/MM3 (ref 0–0.4)
EOSINOPHIL NFR BLD AUTO: 1.6 % (ref 0.3–6.2)
ERYTHROCYTE [DISTWIDTH] IN BLOOD BY AUTOMATED COUNT: 12.2 % (ref 12.3–15.4)
GLOBULIN SER CALC-MCNC: 2.6 GM/DL
GLUCOSE SERPL-MCNC: 71 MG/DL (ref 65–99)
HBA1C MFR BLD: 4.8 % (ref 4.8–5.6)
HCT VFR BLD AUTO: 44.7 % (ref 34–46.6)
HDLC SERPL-MCNC: 49 MG/DL (ref 40–60)
HGB BLD-MCNC: 14.6 G/DL (ref 12–15.9)
IMM GRANULOCYTES # BLD AUTO: 0.01 10*3/MM3 (ref 0–0.05)
IMM GRANULOCYTES NFR BLD AUTO: 0.2 % (ref 0–0.5)
LDLC SERPL CALC-MCNC: 81 MG/DL (ref 0–100)
LYMPHOCYTES # BLD AUTO: 1.99 10*3/MM3 (ref 0.7–3.1)
LYMPHOCYTES NFR BLD AUTO: 39.6 % (ref 19.6–45.3)
MCH RBC QN AUTO: 30.9 PG (ref 26.6–33)
MCHC RBC AUTO-ENTMCNC: 32.7 G/DL (ref 31.5–35.7)
MCV RBC AUTO: 94.5 FL (ref 79–97)
MONOCYTES # BLD AUTO: 0.43 10*3/MM3 (ref 0.1–0.9)
MONOCYTES NFR BLD AUTO: 8.5 % (ref 5–12)
NEUTROPHILS # BLD AUTO: 2.48 10*3/MM3 (ref 1.7–7)
NEUTROPHILS NFR BLD AUTO: 49.3 % (ref 42.7–76)
NRBC BLD AUTO-RTO: 0 /100 WBC (ref 0–0.2)
PLATELET # BLD AUTO: 246 10*3/MM3 (ref 140–450)
POTASSIUM SERPL-SCNC: 4.4 MMOL/L (ref 3.5–5.2)
PROT SERPL-MCNC: 6.9 G/DL (ref 6–8.5)
RBC # BLD AUTO: 4.73 10*6/MM3 (ref 3.77–5.28)
SODIUM SERPL-SCNC: 141 MMOL/L (ref 136–145)
TRIGL SERPL-MCNC: 72 MG/DL (ref 0–150)
TSH SERPL DL<=0.005 MIU/L-ACNC: 1.03 UIU/ML (ref 0.27–4.2)
VLDLC SERPL CALC-MCNC: 14 MG/DL (ref 5–40)
WBC # BLD AUTO: 5.03 10*3/MM3 (ref 3.4–10.8)

## 2025-03-27 RX ORDER — BUSPIRONE HYDROCHLORIDE 10 MG/1
10 TABLET ORAL 4 TIMES DAILY
Start: 2025-03-27 | End: 2025-06-25

## 2025-03-27 RX ORDER — PROPRANOLOL HYDROCHLORIDE 10 MG/1
10 TABLET ORAL AS NEEDED
COMMUNITY

## 2025-03-27 RX ORDER — CLONAZEPAM 0.5 MG/1
TABLET ORAL
COMMUNITY
Start: 2025-02-11

## 2025-03-27 RX ORDER — PRAZOSIN HYDROCHLORIDE 1 MG/1
1 CAPSULE ORAL AS NEEDED
COMMUNITY

## 2025-03-27 NOTE — PROGRESS NOTES
Chief Complaint  Establish Care and Liver Eval     Subjective:      History of Present Illness {CC  Problem List  Visit  Diagnosis   Encounters  Notes  Medications  Labs  Result Review Imaging  Media :23}     Jer Belcher presents to [facility] for:      History of Present Illness        The patient presents for an annual exam.    She has a history of elevated liver enzymes, specifically AST and ALT, which were identified approximately 2 to 3 years ago by her previous primary care physician. She has been abstinent from alcohol for the past 4 years and is hopeful that her liver function has since normalized. She is currently fasting, having only consumed coffee today.    She continues to experience palpitations, predominantly during periods of high stress and more noticeable at night. She has previously consulted with Dr. Diaz for cardiac-related concerns and was subsequently cleared.    She was prescribed prazosin by her psychiatric nurse practitioner, Cheyanne, for the management of PTSD-related nightmares. This medication has been effective in preventing awakenings due to panic attacks. Her condition is well-managed with this regimen. She also takes BuSpar 10 mg 4 times daily, while Klonopin and propranolol are used as needed. The frequency of Klonopin use has decreased from every few days in December and January to approximately once a week currently.    She maintains a regular exercise routine, working out 5 to 6 times per week. She underwent an eye examination over the summer. Her menstrual cycles are regular. She has a history of normal Pap smears and no personal history of breast or colon cancer. She has never undergone a colonoscopy. Her last OB/GYN visit was in December.    SOCIAL HISTORY  The patient has been sober from alcohol for 4 years. She is a former smoker, quit 4 years ago.    FAMILY HISTORY  Both parents have a history of high blood pressure.  Mother had her second open heart surgery last year.    MEDICATIONS  Current: Prazosin, propranolol, Klonopin, BuSpar     Jer is here for coordination of medical care, to discuss health maintenance, disease prevention as well as to followup on medical problems.     Patient Care Team:  Abbi Marvin APRN as PCP - General (Nurse Practitioner)  Arcelia Neal MD as Consulting Physician (Obstetrics and Gynecology)     Activity level is heavy.   Exercises 5 per week.      Health and Weight:   Weight trend is   Wt Readings from Last 4 Encounters:   03/27/25 75 kg (165 lb 6.4 oz)   03/31/22 71.7 kg (158 lb)   03/21/22 71.8 kg (158 lb 3.2 oz)   11/05/20 71.3 kg (157 lb 3.2 oz)       BMI is >= 25 and <30. (Overweight) The following options were offered after discussion;: weight loss educational material (shared in after visit summary), exercise counseling/recommendations, and nutrition counseling/recommendations      Mental Health:   PHQ-2 Depression Screening  Little interest or pleasure in doing things? Not at all   Feeling down, depressed, or hopeless? Not at all   PHQ-2 Total Score 0     Health Maintenance Female:  GYN:  MD jessica 12/2024  Patient's last mammogram was  Last Completed Mammogram    This patient has no relevant Health Maintenance data.        Advised routine self-breast exams monthly.  Pap smears have been: normal  Postmenopausal: [] Yes     STI Screen:   [] HIV     [] Syphilis   [] Chlamydia/ Gonorrhea   [x] Declines STD screen  If tests ordered, patient was informed HIV results will not show up on my chart.     Colon cancer screen:   Patient's last colonoscopy was   Last Completed Colonoscopy    This patient has no relevant Health Maintenance data.        She was advised to repeat in Patient declines due to age.    Vaccines:   [] Flu     [] Tdap   [] Prevnar 20    [] Shingrix (shingles: series of 2)   [] COVID (booster)    []   [x]Declines vaccines      Last eye exam: 2024    Advise regular  sunscreen.      Her cardiovascular risks are:     [] No Known risk factors    [] Hypertension   [] Hyperlipidemia  [] Diabetes    [] Obesity  [x] Family history   [] Current or hx tobacco use  [] Sedentary lifestyle   [] Post-menopausal      I have reviewed patient's medical history, any new submitted information provided by patient or medical assistant and updated medical record.      Objective:      Physical Exam  Vitals reviewed.   Constitutional:       General: She is awake. She is not in acute distress.     Appearance: Normal appearance. She is well-groomed. She is not ill-appearing, toxic-appearing or diaphoretic.   HENT:      Head: Normocephalic.      Right Ear: Hearing normal.      Left Ear: Hearing normal.      Nose: Nose normal.      Mouth/Throat:      Lips: Pink.      Mouth: Mucous membranes are moist.   Eyes:      General: Lids are normal. Vision grossly intact.         Right eye: No foreign body, discharge or hordeolum.         Left eye: No foreign body, discharge or hordeolum.      Extraocular Movements: Extraocular movements intact.      Conjunctiva/sclera: Conjunctivae normal.      Pupils: Pupils are equal, round, and reactive to light. Pupils are equal.   Neck:      Thyroid: No thyroid mass, thyromegaly or thyroid tenderness.      Vascular: No carotid bruit or JVD.      Trachea: Trachea and phonation normal.   Cardiovascular:      Rate and Rhythm: Normal rate and regular rhythm.      Pulses: Normal pulses.      Heart sounds: Normal heart sounds, S1 normal and S2 normal. No murmur heard.     No friction rub. No gallop.   Pulmonary:      Effort: Pulmonary effort is normal. No respiratory distress.      Breath sounds: Normal breath sounds. No stridor, decreased air movement or transmitted upper airway sounds. No decreased breath sounds, wheezing, rhonchi or rales.   Chest:      Chest wall: No tenderness.   Abdominal:      General: Abdomen is flat. Bowel sounds are normal. There is no distension.       "Palpations: Abdomen is soft. There is no mass.      Tenderness: There is no abdominal tenderness. There is no right CVA tenderness, left CVA tenderness, guarding or rebound.      Hernia: No hernia is present.   Musculoskeletal:         General: No swelling, tenderness, deformity or signs of injury. Normal range of motion.      Cervical back: Normal, full passive range of motion without pain and normal range of motion. No rigidity or tenderness.      Thoracic back: Normal.      Lumbar back: Normal.      Right lower leg: No edema.      Left lower leg: No edema.   Lymphadenopathy:      Head:      Right side of head: No submental, submandibular, tonsillar or preauricular adenopathy.      Left side of head: No submental, submandibular, tonsillar or preauricular adenopathy.      Cervical: No cervical adenopathy.   Skin:     General: Skin is warm and dry.      Capillary Refill: Capillary refill takes less than 2 seconds.      Coloration: Skin is not jaundiced or pale.      Findings: No bruising, erythema, lesion or rash.   Neurological:      General: No focal deficit present.      Mental Status: She is alert and oriented to person, place, and time. Mental status is at baseline.      Motor: No weakness.      Gait: Gait normal.   Psychiatric:         Attention and Perception: Attention and perception normal.         Mood and Affect: Mood and affect normal.         Speech: Speech normal.         Behavior: Behavior normal. Behavior is cooperative.         Thought Content: Thought content normal.         Cognition and Memory: Cognition and memory normal.         Judgment: Judgment normal.        Result Review  Data Reviewed:{ Labs  Result Review  Imaging  Med Tab  Media :23}     Results                    Vital Signs:   /82 (BP Location: Left arm, Patient Position: Sitting, Cuff Size: Adult)   Pulse 68   Temp 98 °F (36.7 °C) (Oral)   Ht 165.1 cm (65\")   Wt 75 kg (165 lb 6.4 oz)   SpO2 99%   BMI 27.52 kg/m² "                 Requested Prescriptions     Signed Prescriptions Disp Refills    busPIRone (BUSPAR) 10 MG tablet       Sig: Take 1 tablet by mouth 4 (Four) Times a Day for 90 days. PRN anxiety       Routine medications provided by this office will also be refilled via pharmacy request.       Current Outpatient Medications:     busPIRone (BUSPAR) 10 MG tablet, Take 1 tablet by mouth 4 (Four) Times a Day for 90 days. PRN anxiety, Disp: , Rfl:     clonazePAM (KlonoPIN) 0.5 MG tablet, TAKE 1/2 TO 1 TABLET BY MOUTH AS NEEDED FOR ANXIETY, Disp: , Rfl:     prazosin (MINIPRESS) 1 MG capsule, Take 1 capsule by mouth As Needed., Disp: , Rfl:     propranolol (INDERAL) 10 MG tablet, Take 1 tablet by mouth As Needed., Disp: , Rfl:      Assessment and Plan:      Assessment and Plan {CC Problem List  Visit Diagnosis  ROS  Review (Popup)  Health Maintenance  Quality  BestPractice  Medications  SmartSets  SnapShot Encounters  Media :23}     Problem List Items Addressed This Visit       Situational anxiety - Primary    Relevant Medications    busPIRone (BUSPAR) 10 MG tablet    Other Relevant Orders    TSH Rfx On Abnormal To Free T4     Other Visit Diagnoses         COVID-19 vaccination refused          At risk for coronary artery disease        Relevant Orders    Lipid Panel    Comprehensive Metabolic Panel      Hypertension screen        Relevant Orders    Lipid Panel    CBC & Differential    Comprehensive Metabolic Panel      Routine general medical examination at a health care facility        Relevant Orders    Lipid Panel    CBC & Differential    Comprehensive Metabolic Panel    Hemoglobin A1c      Screening for cardiovascular condition          Screening for ischemic heart disease (IHD)          Anxiety        Relevant Orders    TSH Rfx On Abnormal To Free T4      Chronic post-traumatic stress disorder (PTSD)        Relevant Medications    busPIRone (BUSPAR) 10 MG tablet    Other Relevant Orders    TSH Rfx On  Abnormal To Free T4      Elevated liver enzymes        Relevant Orders    Comprehensive Metabolic Panel      Screening for diabetes mellitus        Relevant Orders    Hemoglobin A1c               1. Elevated liver enzymes.  She had elevated AST and ALT levels 2-3 years ago. She has been sober from alcohol for 4 years and hopes her liver has healed. A metabolic panel will be ordered to check liver enzymes, kidney function, and electrolytes.    2. Palpitations.  She experiences palpitations mostly during stress and at night. She has been cleared by her cardiologist for any heart-related issues.    3. Post-traumatic stress disorder (PTSD).  She is on prazosin for PTSD-related nightmares, which has been effective in preventing nighttime panic attacks. She also takes BuSpar 10 mg four times daily and uses Klonopin as needed for panic attacks, which have decreased to about once a week.    4. Health maintenance.  She is due for a COVID-19 vaccine but prefers to wait as she had COVID-19 two months ago. A comprehensive set of labs will be ordered, including a CBC, lipid panel, A1c, and thyroid function tests.    Follow-up  The patient will follow up in 1 year for her annual physical examination.         Follow Up {Instructions Charge Capture  Follow-up Communications :23}     Return in about 1 year (around 3/27/2026) for Annual physical.      Patient was given instructions and counseling regarding her condition or for health maintenance advice. Please see specific information pulled into the AVS if appropriate.    Vivienne disclaimer:   Much of this encounter note is an electronic transcription/translation of spoken language to printed text. The electronic translation of spoken language may permit erroneous, or at times, nonsensical words or phrases to be inadvertently transcribed; Although I have reviewed the note for such errors, some may still exist.         Additional Patient Counseling:       Patient Instructions    Preventive Care 21-39 Years Old, Female  Preventive care refers to lifestyle choices and visits with your health care provider that can promote health and wellness. Preventive care visits are also called wellness exams.  What can I expect for my preventive care visit?  Counseling  During your preventive care visit, your health care provider may ask about your:  Medical history, including:  Past medical problems.  Family medical history.  Pregnancy history.  Current health, including:  Menstrual cycle.  Method of birth control.  Emotional well-being.  Home life and relationship well-being.  Sexual activity and sexual health.  Lifestyle, including:  Alcohol, nicotine or tobacco, and drug use.  Access to firearms.  Diet, exercise, and sleep habits.  Work and work environment.  Sunscreen use.  Safety issues such as seatbelt and bike helmet use.  Physical exam  Your health care provider may check your:  Height and weight. These may be used to calculate your BMI (body mass index). BMI is a measurement that tells if you are at a healthy weight.  Waist circumference. This measures the distance around your waistline. This measurement also tells if you are at a healthy weight and may help predict your risk of certain diseases, such as type 2 diabetes and high blood pressure.  Heart rate and blood pressure.  Body temperature.  Skin for abnormal spots.  What immunizations do I need?    Vaccines are usually given at various ages, according to a schedule. Your health care provider will recommend vaccines for you based on your age, medical history, and lifestyle or other factors, such as travel or where you work.  What tests do I need?  Screening  Your health care provider may recommend screening tests for certain conditions. This may include:  Pelvic exam and Pap test.  Lipid and cholesterol levels.  Diabetes screening. This is done by checking your blood sugar (glucose) after you have not eaten for a while  (fasting).  Hepatitis B test.  Hepatitis C test.  HIV (human immunodeficiency virus) test.  STI (sexually transmitted infection) testing, if you are at risk.  BRCA-related cancer screening. This may be done if you have a family history of breast, ovarian, tubal, or peritoneal cancers.  Talk with your health care provider about your test results, treatment options, and if necessary, the need for more tests.  Follow these instructions at home:  Eating and drinking    Eat a healthy diet that includes fresh fruits and vegetables, whole grains, lean protein, and low-fat dairy products.  Take vitamin and mineral supplements as recommended by your health care provider.  Do not drink alcohol if:  Your health care provider tells you not to drink.  You are pregnant, may be pregnant, or are planning to become pregnant.  If you drink alcohol:  Limit how much you have to 0-1 drink a day.  Know how much alcohol is in your drink. In the [place] one drink equals one 12 oz bottle of beer (355 mL), one 5 oz glass of wine (148 mL), or one 1½ oz glass of hard liquor (44 mL).  Lifestyle  Brush your teeth every morning and night with fluoride toothpaste. Floss one time each day.  Exercise for at least 30 minutes 5 or more days each week.  Do not use any products that contain nicotine or tobacco. These products include cigarettes, chewing tobacco, and vaping devices, such as e-cigarettes. If you need help quitting, ask your health care provider.  Do not use drugs.  If you are sexually active, practice safe sex. Use a condom or other form of protection to prevent STIs.  If you do not wish to become pregnant, use a form of birth control. If you plan to become pregnant, see your health care provider for a prepregnancy visit.  Find healthy ways to manage stress, such as:  Meditation, yoga, or listening to music.  Journaling.  Talking to a trusted person.  Spending time with friends and family.  Minimize exposure to UV radiation to reduce your  risk of skin cancer.  Safety  Always wear your seat belt while driving or riding in a vehicle.  Do not drive:  If you have been drinking alcohol. Do not ride with someone who has been drinking.  If you have been using any mind-altering substances or drugs.  While texting.  When you are tired or distracted.  Wear a helmet and other protective equipment during sports activities.  If you have firearms in your house, make sure you follow all gun safety procedures.  Seek help if you have been physically or sexually abused.  What's next?  Go to your health care provider once a year for an annual wellness visit.  Ask your health care provider how often you should have your eyes and teeth checked.  Stay up to date on all vaccines.  This information is not intended to replace advice given to you by your health care provider. Make sure you discuss any questions you have with your health care provider.  Document Revised: 06/15/2022 Document Reviewed: 06/15/2022  [employer] Patient Education © 2024 [employer]     Patient or patient representative verbalized consent for the use of Ambient Listening during the visit with  STEPH Hilton for chart documentation. 3/27/2025  11:12 EDT

## (undated) DEVICE — GLV SURG BIOGEL LTX PF 7 1/2

## (undated) DEVICE — SUT MNCRYL PLS ANTIB UD 4/0 PS2 18IN

## (undated) DEVICE — SUT MNCRYL 0/0 CTX 36IN Y398H

## (undated) DEVICE — GLV SURG BIOGEL LTX PF 6 1/2

## (undated) DEVICE — KT ART BLD GAS QUICK DRAW

## (undated) DEVICE — STRIP SKIN CLOSEURE PROXI 1X5IN

## (undated) DEVICE — HOSE BT TO BT VAC CURETTAGE SNGL PT USE

## (undated) DEVICE — SUT VIC 0 CT1 36IN J946H

## (undated) DEVICE — NDL SPINE 22G 31/2IN BLK

## (undated) DEVICE — NDL BLNT 18G 1 1/2IN

## (undated) DEVICE — CANSTR SXN UTER NO FLTR SURG

## (undated) DEVICE — SACK GZ PERM

## (undated) DEVICE — SUT VIC 3/0 CTI 36IN J944H

## (undated) DEVICE — TP SXN VACURETTE CRV 7MM

## (undated) DEVICE — Device

## (undated) DEVICE — SUT PDS 0 CT1 36IN Z346H

## (undated) DEVICE — LOU D & C HYSTEROSCOPY: Brand: MEDLINE INDUSTRIES, INC.

## (undated) DEVICE — ST COL BERKELY TBG

## (undated) DEVICE — GLV SURG BIOGEL LTX PF 7

## (undated) DEVICE — STRAP STIRUP SLP RNG 19X3.5IN DISP

## (undated) DEVICE — KENDALL SCD EXPRESS SLEEVES, KNEE LENGTH, MEDIUM: Brand: KENDALL SCD

## (undated) DEVICE — SOL IRR NACL 0.9PCT BT 1000ML

## (undated) DEVICE — PAD SANI MAXI W/ADHS SNG WRP 11IN

## (undated) DEVICE — 3M™ STERI-STRIP™ COMPOUND BENZOIN TINCTURE 40 BAGS/CARTON 4 CARTONS/CASE C1544: Brand: 3M™ STERI-STRIP™

## (undated) DEVICE — 3M(TM) TEGADERM(TM) TRANSPARENT FILM DRESSING FRAME STYLE 1627: Brand: 3M™ TEGADERM™

## (undated) DEVICE — TBG W FLTR FOR BERKELEY SYSTEM